# Patient Record
Sex: FEMALE | Race: WHITE | Employment: FULL TIME | URBAN - METROPOLITAN AREA
[De-identification: names, ages, dates, MRNs, and addresses within clinical notes are randomized per-mention and may not be internally consistent; named-entity substitution may affect disease eponyms.]

---

## 2017-02-19 ENCOUNTER — HOSPITAL ENCOUNTER (EMERGENCY)
Facility: HOSPITAL | Age: 52
Discharge: HOME | End: 2017-02-19
Attending: EMERGENCY MEDICINE
Payer: MEDICAID

## 2017-02-19 ENCOUNTER — APPOINTMENT (OUTPATIENT)
Dept: CT IMAGING | Facility: HOSPITAL | Age: 52
End: 2017-02-19
Attending: EMERGENCY MEDICINE
Payer: MEDICAID

## 2017-02-19 VITALS
TEMPERATURE: 99 F | WEIGHT: 200 LBS | SYSTOLIC BLOOD PRESSURE: 108 MMHG | BODY MASS INDEX: 34.15 KG/M2 | DIASTOLIC BLOOD PRESSURE: 64 MMHG | OXYGEN SATURATION: 98 % | RESPIRATION RATE: 16 BRPM | HEART RATE: 91 BPM | HEIGHT: 64 IN

## 2017-02-19 DIAGNOSIS — R10.9 ABDOMINAL PAIN OF UNKNOWN ETIOLOGY: ICD-10-CM

## 2017-02-19 DIAGNOSIS — J01.90 ACUTE SINUSITIS, RECURRENCE NOT SPECIFIED, UNSPECIFIED LOCATION: Primary | ICD-10-CM

## 2017-02-19 LAB
ALBUMIN SERPL BCP-MCNC: 3.8 G/DL (ref 3.5–4.8)
ALBUMIN/GLOB SERPL: 1.2 {RATIO} (ref 1–2)
ALP SERPL-CCNC: 78 U/L (ref 32–100)
ALT SERPL-CCNC: 22 U/L (ref 14–54)
ANION GAP SERPL CALC-SCNC: 9 MMOL/L (ref 0–18)
AST SERPL-CCNC: 25 U/L (ref 15–41)
BACTERIA UR QL AUTO: NEGATIVE /HPF
BASOPHILS # BLD: 0.1 K/UL (ref 0–0.2)
BASOPHILS NFR BLD: 1 %
BILIRUB SERPL-MCNC: 0.9 MG/DL (ref 0.3–1.2)
BILIRUB UR QL: NEGATIVE
BUN SERPL-MCNC: 11 MG/DL (ref 8–20)
BUN/CREAT SERPL: 13.8 (ref 10–20)
CALCIUM SERPL-MCNC: 9.2 MG/DL (ref 8.5–10.5)
CHLORIDE SERPL-SCNC: 102 MMOL/L (ref 95–110)
CLARITY UR: CLEAR
CO2 SERPL-SCNC: 25 MMOL/L (ref 22–32)
COLOR UR: YELLOW
CREAT SERPL-MCNC: 0.8 MG/DL (ref 0.5–1.5)
EOSINOPHIL # BLD: 0.1 K/UL (ref 0–0.7)
EOSINOPHIL NFR BLD: 1 %
ERYTHROCYTE [DISTWIDTH] IN BLOOD BY AUTOMATED COUNT: 13.9 % (ref 11–15)
GLOBULIN PLAS-MCNC: 3.1 G/DL (ref 2.5–3.7)
GLUCOSE SERPL-MCNC: 101 MG/DL (ref 70–99)
GLUCOSE UR-MCNC: NEGATIVE MG/DL
HCT VFR BLD AUTO: 40 % (ref 35–48)
HGB BLD-MCNC: 13.4 G/DL (ref 12–16)
KETONES UR-MCNC: NEGATIVE MG/DL
LEUKOCYTE ESTERASE UR QL STRIP.AUTO: NEGATIVE
LYMPHOCYTES # BLD: 1 K/UL (ref 1–4)
LYMPHOCYTES NFR BLD: 9 %
MCH RBC QN AUTO: 29.2 PG (ref 27–32)
MCHC RBC AUTO-ENTMCNC: 33.4 G/DL (ref 32–37)
MCV RBC AUTO: 87.4 FL (ref 80–100)
MONOCYTES # BLD: 0.7 K/UL (ref 0–1)
MONOCYTES NFR BLD: 6 %
NEUTROPHILS # BLD AUTO: 9.2 K/UL (ref 1.8–7.7)
NEUTROPHILS NFR BLD: 83 %
NITRITE UR QL STRIP.AUTO: NEGATIVE
OSMOLALITY UR CALC.SUM OF ELEC: 282 MOSM/KG (ref 275–295)
PH UR: 6 [PH] (ref 5–8)
PLATELET # BLD AUTO: 270 K/UL (ref 140–400)
PMV BLD AUTO: 7.2 FL (ref 7.4–10.3)
POTASSIUM SERPL-SCNC: 4.4 MMOL/L (ref 3.3–5.1)
PROT SERPL-MCNC: 6.9 G/DL (ref 5.9–8.4)
PROT UR-MCNC: NEGATIVE MG/DL
RBC # BLD AUTO: 4.58 M/UL (ref 3.7–5.4)
RBC #/AREA URNS AUTO: 17 /HPF
SODIUM SERPL-SCNC: 136 MMOL/L (ref 136–144)
SP GR UR STRIP: 1.02 (ref 1–1.03)
UROBILINOGEN UR STRIP-ACNC: <2
VIT C UR-MCNC: NEGATIVE MG/DL
WBC # BLD AUTO: 11 K/UL (ref 4–11)
WBC #/AREA URNS AUTO: <1 /HPF

## 2017-02-19 PROCEDURE — 96361 HYDRATE IV INFUSION ADD-ON: CPT

## 2017-02-19 PROCEDURE — 85025 COMPLETE CBC W/AUTO DIFF WBC: CPT | Performed by: EMERGENCY MEDICINE

## 2017-02-19 PROCEDURE — 81001 URINALYSIS AUTO W/SCOPE: CPT | Performed by: EMERGENCY MEDICINE

## 2017-02-19 PROCEDURE — 96374 THER/PROPH/DIAG INJ IV PUSH: CPT

## 2017-02-19 PROCEDURE — 74176 CT ABD & PELVIS W/O CONTRAST: CPT

## 2017-02-19 PROCEDURE — 80053 COMPREHEN METABOLIC PANEL: CPT | Performed by: EMERGENCY MEDICINE

## 2017-02-19 PROCEDURE — 99284 EMERGENCY DEPT VISIT MOD MDM: CPT

## 2017-02-19 RX ORDER — ONDANSETRON 2 MG/ML
4 INJECTION INTRAMUSCULAR; INTRAVENOUS ONCE
Status: COMPLETED | OUTPATIENT
Start: 2017-02-19 | End: 2017-02-19

## 2017-02-19 RX ORDER — PREDNISONE 20 MG/1
40 TABLET ORAL DAILY
Qty: 6 TABLET | Refills: 0 | Status: SHIPPED | OUTPATIENT
Start: 2017-02-19 | End: 2017-02-22

## 2017-02-19 RX ORDER — AZITHROMYCIN 250 MG/1
TABLET, FILM COATED ORAL
Qty: 1 PACKAGE | Refills: 0 | Status: SHIPPED | OUTPATIENT
Start: 2017-02-19 | End: 2017-02-24

## 2017-02-19 RX ORDER — SACCHAROMYCES BOULARDII 250 MG
250 CAPSULE ORAL 2 TIMES DAILY
Qty: 20 CAPSULE | Refills: 0 | Status: SHIPPED | OUTPATIENT
Start: 2017-02-19 | End: 2017-03-01

## 2017-02-19 NOTE — ED INITIAL ASSESSMENT (HPI)
Pt returned from vacation Thursday and c/o chills, sinus infection. Today with nausea, diarrhea and abd pain. No dysuria. Taking sudafed for sinus problems.

## 2017-02-20 NOTE — ED PROVIDER NOTES
Patient Seen in: [facility] AND [facility]    History   Patient presents with: Body ache and/or chills    Stated Complaint: Abdominal Pain; Diarrhea;     HPI    Patient complains of uri symptoms with sinus congestion for few days.   Sinus co 1046 Oral   SpO2 02/19/17 1046 100 %   O2 Device 02/19/17 1046 None (Room air)       Current:/64 mmHg  Pulse 91  Temp(Src) 98.9 °F (37.2 °C) (Oral)  Resp 16  Ht 162.6 cm (5' 4\")  Wt 90.719 kg  BMI 34.31 kg/m2  SpO2 98%  LMP 04/29/2005   PULSE OX Nl Abdomen+pelvis(cpt=74176)    2/19/2017  CONCLUSION:  1. No significant small or large bowel dilatation. No significant diverticular disease. 2. Hysterectomy. Bilobed left adnexal cyst measures 3.6 x 3.2 cm.  Similar finding present in retrospect on prior ex

## 2017-04-13 ENCOUNTER — HOSPITAL ENCOUNTER (OUTPATIENT)
Facility: HOSPITAL | Age: 52
Setting detail: OBSERVATION
Discharge: HOME | End: 2017-04-15
Attending: EMERGENCY MEDICINE | Admitting: HOSPITALIST
Payer: MEDICAID

## 2017-04-13 DIAGNOSIS — K62.5 RECTAL BLEEDING: Primary | ICD-10-CM

## 2017-04-13 DIAGNOSIS — R19.7 DIARRHEA, UNSPECIFIED TYPE: ICD-10-CM

## 2017-04-13 PROCEDURE — 80048 BASIC METABOLIC PNL TOTAL CA: CPT | Performed by: EMERGENCY MEDICINE

## 2017-04-13 PROCEDURE — 96360 HYDRATION IV INFUSION INIT: CPT

## 2017-04-13 PROCEDURE — 99285 EMERGENCY DEPT VISIT HI MDM: CPT

## 2017-04-13 PROCEDURE — 86901 BLOOD TYPING SEROLOGIC RH(D): CPT | Performed by: EMERGENCY MEDICINE

## 2017-04-13 PROCEDURE — 86900 BLOOD TYPING SEROLOGIC ABO: CPT | Performed by: EMERGENCY MEDICINE

## 2017-04-13 PROCEDURE — 87507 IADNA-DNA/RNA PROBE TQ 12-25: CPT | Performed by: HOSPITALIST

## 2017-04-13 PROCEDURE — 86850 RBC ANTIBODY SCREEN: CPT | Performed by: EMERGENCY MEDICINE

## 2017-04-13 PROCEDURE — 85014 HEMATOCRIT: CPT | Performed by: HOSPITALIST

## 2017-04-13 PROCEDURE — 96361 HYDRATE IV INFUSION ADD-ON: CPT

## 2017-04-13 PROCEDURE — 81001 URINALYSIS AUTO W/SCOPE: CPT | Performed by: EMERGENCY MEDICINE

## 2017-04-13 PROCEDURE — 85018 HEMOGLOBIN: CPT | Performed by: HOSPITALIST

## 2017-04-13 PROCEDURE — 85025 COMPLETE CBC W/AUTO DIFF WBC: CPT | Performed by: EMERGENCY MEDICINE

## 2017-04-13 RX ORDER — MORPHINE SULFATE 4 MG/ML
4 INJECTION, SOLUTION INTRAMUSCULAR; INTRAVENOUS EVERY 2 HOUR PRN
Status: DISCONTINUED | OUTPATIENT
Start: 2017-04-13 | End: 2017-04-15

## 2017-04-13 RX ORDER — CARVEDILOL 25 MG/1
25 TABLET ORAL 2 TIMES DAILY WITH MEALS
Status: DISCONTINUED | OUTPATIENT
Start: 2017-04-13 | End: 2017-04-15

## 2017-04-13 RX ORDER — ATORVASTATIN CALCIUM 10 MG/1
10 TABLET, FILM COATED ORAL NIGHTLY
Status: DISCONTINUED | OUTPATIENT
Start: 2017-04-13 | End: 2017-04-15

## 2017-04-13 RX ORDER — ONDANSETRON 2 MG/ML
4 INJECTION INTRAMUSCULAR; INTRAVENOUS EVERY 6 HOURS PRN
Status: DISCONTINUED | OUTPATIENT
Start: 2017-04-13 | End: 2017-04-15

## 2017-04-13 RX ORDER — DEXTROSE AND SODIUM CHLORIDE 5; .45 G/100ML; G/100ML
INJECTION, SOLUTION INTRAVENOUS CONTINUOUS
Status: DISCONTINUED | OUTPATIENT
Start: 2017-04-13 | End: 2017-04-14

## 2017-04-13 RX ORDER — MORPHINE SULFATE 2 MG/ML
1 INJECTION, SOLUTION INTRAMUSCULAR; INTRAVENOUS EVERY 2 HOUR PRN
Status: DISCONTINUED | OUTPATIENT
Start: 2017-04-13 | End: 2017-04-15

## 2017-04-13 RX ORDER — MORPHINE SULFATE 2 MG/ML
2 INJECTION, SOLUTION INTRAMUSCULAR; INTRAVENOUS EVERY 2 HOUR PRN
Status: DISCONTINUED | OUTPATIENT
Start: 2017-04-13 | End: 2017-04-15

## 2017-04-13 RX ORDER — ACETAMINOPHEN 325 MG/1
650 TABLET ORAL EVERY 6 HOURS PRN
Status: DISCONTINUED | OUTPATIENT
Start: 2017-04-13 | End: 2017-04-15

## 2017-04-13 NOTE — ED PROVIDER NOTES
Patient Seen in: [facility] Emergency Department    History   Patient presents with:  GI Bleeding (gastrointestinal)    Stated Complaint: diarrhea    HPI    Patient is a 48-year-old female with a past history of cardiomyopathy, arrhythmias in the Current:/70 mmHg  Pulse 72  Temp(Src) 98.4 °F (36.9 °C) (Oral)  Resp 19  Ht 162.6 cm (5' 4\")  Wt 86.183 kg  BMI 32.60 kg/m2  SpO2 100%  LMP 04/29/2005        Physical Exam    Constitutional: Well-developed well-nourished in no acute distress following orders were created for panel order TYPE AND SCREEN.   Procedure                               Abnormality         Status                     ---------                               -----------         ------                     BLOOD TYPE, ABO AN

## 2017-04-13 NOTE — H&P
[facility] Hospitalist H&P       CC: Patient presents with:  GI Bleeding (gastrointestinal)       PCP: Idania Barth MD    ASSESSMENT / PLAN:   Patient is a 45year old female with PMH sig for cardiomyopathy EF 40%, pvcs, and HLD who presents with a c/co o contacts raw/undercooked food or recent travel. No recent antibiotics. No chest pain/sob. No fever/chills. Never had CHF with her cardiomyopathy.       [facility]  Past Medical History   Diagnosis Date   • Cardiomyopathy ([facility] [address]    • Cardiomyopathy ([facility] [address]    • Visual appreciated   Abdomen:   Soft, mild tenderness lower abdominal quadrants. No masses,  No organomegaly. Non distended. +bs heard   Extremities: Extremities normal, atraumatic, no cyanosis or edema. Skin: Skin color, texture. No rashes or lesions.     Rea Calderon

## 2017-04-13 NOTE — CONSULTS
REFERRING PHYSICIAN: Dr. Ochoa ref. provider found    HPI:         Thank you very much for requesting me to see the patient.     As you know, Bia Rodriguez is a 45year old female who presented to ER today with c/o severe abd cramps starting about 9:30 am y lesions  HEENT: anicteric; no JVD. NECK: supple, no adenopathy, no bruits  LUNGS: clear to auscultation  CARDIO: RRR, nl s1 and s2. No murmers appreciated. GI: Soft, NT, ND, normal BS. NO HSM, masses, hernias or bruits.   EXTREMITIES: no cyanosis, clubbin

## 2017-04-14 PROCEDURE — 80048 BASIC METABOLIC PNL TOTAL CA: CPT | Performed by: HOSPITALIST

## 2017-04-14 PROCEDURE — 96361 HYDRATE IV INFUSION ADD-ON: CPT

## 2017-04-14 PROCEDURE — 85014 HEMATOCRIT: CPT | Performed by: HOSPITALIST

## 2017-04-14 PROCEDURE — 85018 HEMOGLOBIN: CPT | Performed by: HOSPITALIST

## 2017-04-14 PROCEDURE — 85025 COMPLETE CBC W/AUTO DIFF WBC: CPT | Performed by: HOSPITALIST

## 2017-04-14 RX ORDER — LISINOPRIL 5 MG/1
5 TABLET ORAL DAILY
Status: DISCONTINUED | OUTPATIENT
Start: 2017-04-14 | End: 2017-04-15

## 2017-04-14 NOTE — PROGRESS NOTES
GI  PROGRESS NOTE    SUBJECTIVE: relates diarrhea persists, 3 BM's overnight. Watery diarrhea without brb. Abdominal cramps prior to BM's. Tolerating diet.        OBJECTIVE:  Temp:  [98 °F (36.7 °C)-98.9 °F (37.2 °C)] 98.4 °F (36.9 °C)  Pulse:  [61-81] 61

## 2017-04-14 NOTE — PAYOR COMM NOTE
Alicia Gun #[id number]  (47 year old F)       [facility] [id number]         Santos Hall DO Physician Signed  H&P 4/13/2017  4:47 PM      Expand All Collapse All    [facility] Hospitalist H&P        CC: Patient presents with:  GI Bleeding (gastrointestinal) had a bowel movement. Bowel movement was watery and initially brown stool which progressed to contain bright red blood later in the day. No nausea or vomiting. No sick contacts raw/undercooked food or recent travel. No recent antibiotics.  No chest pain/sob or supraclavicular lymph adenopathy, thyroid: no enlargment/tenderness/nodules appreciated    Lungs:    Clear to auscultation bilaterally.  Normal effort    Chest wall:   No tenderness or deformity.    Heart:   Regular rate and rhythm, S1, S2 normal, no mur

## 2017-04-14 NOTE — PROGRESS NOTES
[facility] Hospitalist Progress Note     CC: Hospital Follow up    PCP: Tabby Stockton MD       Assessment/Plan:   Patient is a 45year old female with PMH sig for cardiomyopathy EF 40%, pvcs, and HLD who presents with a c/co of diarrhea, abdominal cramping (36.7 °C)-98.9 °F (37.2 °C)] 98.4 °F (36.9 °C)  Pulse:  [61-81] 61  Resp:  [18-19] 18  BP: (105-126)/(55-77) 123/73 mmHg      Intake/Output:    Intake/Output Summary (Last 24 hours) at 04/14/17 1403  Last data filed at 04/14/17 0900   Gross per 24 hour   I HCl

## 2017-04-14 NOTE — PLAN OF CARE
Problem: Patient/Family Goals  Goal: Patient/Family Long Term Goal  Patient’s Long Term Goal: Patient will return home.   Interventions:  Clear liquid diet  Monitor I & O  Advance diet as tolerated  Monitor electrolytes and lab resuls  - See additional Care

## 2017-04-15 VITALS
WEIGHT: 207.31 LBS | TEMPERATURE: 98 F | OXYGEN SATURATION: 96 % | BODY MASS INDEX: 35.39 KG/M2 | HEART RATE: 62 BPM | HEIGHT: 64 IN | DIASTOLIC BLOOD PRESSURE: 76 MMHG | SYSTOLIC BLOOD PRESSURE: 121 MMHG | RESPIRATION RATE: 18 BRPM

## 2017-04-15 PROCEDURE — 80048 BASIC METABOLIC PNL TOTAL CA: CPT | Performed by: HOSPITALIST

## 2017-04-15 PROCEDURE — 85025 COMPLETE CBC W/AUTO DIFF WBC: CPT | Performed by: HOSPITALIST

## 2017-04-15 NOTE — PROGRESS NOTES
NURSING DISCHARGE NOTE    Discharged Home via Ambulatory. Accompanied by Support staff  Belongings Taken by patient/family.

## 2017-04-15 NOTE — DISCHARGE SUMMARY
[facility] Hospitalist Discharge Summary   Patient ID:  Ryann Wheeler  [id number]  59 year old  6/11/1965    Admit date: 4/13/2017  Discharge date: 4/15/2017  Primary Care Physician: Keon Hurtado MD   Attending Physician: No att. providers found   Cons mcguire  EXTREMITIES: no edema, no calf tenderness    Operative Procedures:    Radiology:         Discharge Instructions     Medication List      CONTINUE taking these medications          Atorvastatin Calcium 10 MG Tabs   Commonly known as:  LIPITOR       C

## 2017-04-15 NOTE — PROGRESS NOTES
[facility]  Gastroenterology Progress Note    Damon Solares Patient Status:  Observation    1965 MRN [id number]   Location [address] [facility] Attending Santos Hall, [address] [facility] Day # 2 PCP Dimas Molina MD     Subjective:   Danni Armendariz

## 2017-04-15 NOTE — PLAN OF CARE
Problem: Patient/Family Goals  Goal: Patient/Family Long Term Goal  Patient’s Long Term Goal: Patient will return home.   Interventions:  Monitor I & O  Advance diet as tolerated  Monitor electrolytes and lab resuls  - See additional Care Plan goals for spe

## 2017-08-25 PROBLEM — D17.9 MULTIPLE LIPOMAS: Status: ACTIVE | Noted: 2017-08-25

## 2017-11-29 ENCOUNTER — LAB ENCOUNTER (OUTPATIENT)
Dept: LAB | Age: 52
End: 2017-11-29
Attending: SPECIALIST
Payer: MEDICAID

## 2017-11-29 DIAGNOSIS — I10 HTN (HYPERTENSION): ICD-10-CM

## 2017-11-29 DIAGNOSIS — E78.49 FAMILIAL HYPERLIPIDEMIA: ICD-10-CM

## 2017-11-29 DIAGNOSIS — Z79.899 HIGH RISK MEDICATIONS (NOT ANTICOAGULANTS) LONG-TERM USE: Primary | ICD-10-CM

## 2017-11-29 PROCEDURE — 84443 ASSAY THYROID STIM HORMONE: CPT

## 2017-11-29 PROCEDURE — 80061 LIPID PANEL: CPT

## 2017-11-29 PROCEDURE — 36415 COLL VENOUS BLD VENIPUNCTURE: CPT

## 2017-11-29 PROCEDURE — 80053 COMPREHEN METABOLIC PANEL: CPT

## 2017-11-29 PROCEDURE — 83735 ASSAY OF MAGNESIUM: CPT

## 2018-01-28 ENCOUNTER — HOSPITAL ENCOUNTER (EMERGENCY)
Facility: HOSPITAL | Age: 53
Discharge: HOME | End: 2018-01-29
Attending: EMERGENCY MEDICINE
Payer: MEDICAID

## 2018-01-28 DIAGNOSIS — R11.2 NAUSEA VOMITING AND DIARRHEA: Primary | ICD-10-CM

## 2018-01-28 DIAGNOSIS — R19.7 NAUSEA VOMITING AND DIARRHEA: Primary | ICD-10-CM

## 2018-01-28 DIAGNOSIS — R10.84 ABDOMINAL PAIN, GENERALIZED: ICD-10-CM

## 2018-01-28 LAB
BASOPHILS # BLD: 0 K/UL (ref 0–0.2)
BASOPHILS NFR BLD: 0 %
EOSINOPHIL # BLD: 0 K/UL (ref 0–0.7)
EOSINOPHIL NFR BLD: 0 %
ERYTHROCYTE [DISTWIDTH] IN BLOOD BY AUTOMATED COUNT: 14 % (ref 11–15)
HCT VFR BLD AUTO: 43.7 % (ref 35–48)
HGB BLD-MCNC: 14.5 G/DL (ref 12–16)
LYMPHOCYTES # BLD: 0.2 K/UL (ref 1–4)
LYMPHOCYTES NFR BLD: 2 %
MCH RBC QN AUTO: 29.1 PG (ref 27–32)
MCHC RBC AUTO-ENTMCNC: 33.1 G/DL (ref 32–37)
MCV RBC AUTO: 88 FL (ref 80–100)
MONOCYTES # BLD: 0.3 K/UL (ref 0–1)
MONOCYTES NFR BLD: 3 %
NEUTROPHILS # BLD AUTO: 8.6 K/UL (ref 1.8–7.7)
NEUTROPHILS NFR BLD: 94 %
PLATELET # BLD AUTO: 276 K/UL (ref 140–400)
PMV BLD AUTO: 6.8 FL (ref 7.4–10.3)
RBC # BLD AUTO: 4.97 M/UL (ref 3.7–5.4)
WBC # BLD AUTO: 9.2 K/UL (ref 4–11)

## 2018-01-28 PROCEDURE — 80048 BASIC METABOLIC PNL TOTAL CA: CPT | Performed by: EMERGENCY MEDICINE

## 2018-01-28 PROCEDURE — 99284 EMERGENCY DEPT VISIT MOD MDM: CPT

## 2018-01-28 PROCEDURE — 96375 TX/PRO/DX INJ NEW DRUG ADDON: CPT

## 2018-01-28 PROCEDURE — 85025 COMPLETE CBC W/AUTO DIFF WBC: CPT | Performed by: EMERGENCY MEDICINE

## 2018-01-28 PROCEDURE — 96361 HYDRATE IV INFUSION ADD-ON: CPT

## 2018-01-28 PROCEDURE — 96374 THER/PROPH/DIAG INJ IV PUSH: CPT

## 2018-01-28 RX ORDER — ONDANSETRON 2 MG/ML
4 INJECTION INTRAMUSCULAR; INTRAVENOUS ONCE
Status: COMPLETED | OUTPATIENT
Start: 2018-01-28 | End: 2018-01-28

## 2018-01-28 RX ORDER — ONDANSETRON 4 MG/1
4 TABLET, ORALLY DISINTEGRATING ORAL ONCE
Status: COMPLETED | OUTPATIENT
Start: 2018-01-28 | End: 2018-01-28

## 2018-01-28 RX ORDER — DICYCLOMINE HCL 20 MG
20 TABLET ORAL ONCE
Status: COMPLETED | OUTPATIENT
Start: 2018-01-28 | End: 2018-01-28

## 2018-01-28 RX ORDER — KETOROLAC TROMETHAMINE 30 MG/ML
30 INJECTION, SOLUTION INTRAMUSCULAR; INTRAVENOUS ONCE
Status: COMPLETED | OUTPATIENT
Start: 2018-01-28 | End: 2018-01-28

## 2018-01-29 VITALS
OXYGEN SATURATION: 99 % | SYSTOLIC BLOOD PRESSURE: 131 MMHG | WEIGHT: 200 LBS | HEART RATE: 94 BPM | DIASTOLIC BLOOD PRESSURE: 80 MMHG | RESPIRATION RATE: 18 BRPM | TEMPERATURE: 99 F | BODY MASS INDEX: 35 KG/M2

## 2018-01-29 LAB
ANION GAP SERPL CALC-SCNC: 10 MMOL/L (ref 0–18)
BUN SERPL-MCNC: 18 MG/DL (ref 8–20)
BUN/CREAT SERPL: 18.9 (ref 10–20)
CALCIUM SERPL-MCNC: 9 MG/DL (ref 8.5–10.5)
CHLORIDE SERPL-SCNC: 104 MMOL/L (ref 95–110)
CO2 SERPL-SCNC: 25 MMOL/L (ref 22–32)
CREAT SERPL-MCNC: 0.95 MG/DL (ref 0.5–1.5)
GLUCOSE SERPL-MCNC: 135 MG/DL (ref 70–99)
OSMOLALITY UR CALC.SUM OF ELEC: 292 MOSM/KG (ref 275–295)
POTASSIUM SERPL-SCNC: 3.7 MMOL/L (ref 3.3–5.1)
SODIUM SERPL-SCNC: 139 MMOL/L (ref 136–144)

## 2018-01-29 RX ORDER — MAGNESIUM HYDROXIDE/ALUMINUM HYDROXICE/SIMETHICONE 120; 1200; 1200 MG/30ML; MG/30ML; MG/30ML
30 SUSPENSION ORAL ONCE
Status: COMPLETED | OUTPATIENT
Start: 2018-01-29 | End: 2018-01-29

## 2018-01-29 RX ORDER — DICYCLOMINE HCL 20 MG
20 TABLET ORAL 4 TIMES DAILY PRN
Qty: 30 TABLET | Refills: 0 | Status: SHIPPED | OUTPATIENT
Start: 2018-01-29 | End: 2018-08-27

## 2018-01-29 RX ORDER — LOPERAMIDE HYDROCHLORIDE 2 MG/1
2 TABLET ORAL AS NEEDED
Qty: 20 TABLET | Refills: 0 | Status: SHIPPED | OUTPATIENT
Start: 2018-01-29 | End: 2018-02-28

## 2018-01-29 RX ORDER — ONDANSETRON 4 MG/1
4 TABLET, ORALLY DISINTEGRATING ORAL EVERY 4 HOURS PRN
Qty: 15 TABLET | Refills: 0 | Status: SHIPPED | OUTPATIENT
Start: 2018-01-29 | End: 2018-02-02 | Stop reason: CLARIF

## 2018-01-29 NOTE — ED PROVIDER NOTES
Patient Seen in: [facility] Emergency Department    History   Patient presents with:  Nausea/Vomiting/Diarrhea (gastrointestinal)      HPI    Patient presents to the ED complaining of intermittent crampy abdominal pain, nausea, vomiting, diarrhea Constitutional: She appears well-developed and well-nourished. No distress. HENT:   Head: Normocephalic and atraumatic. Eyes: Conjunctivae are normal. Right eye exhibits no discharge. Left eye exhibits no discharge.    Neck: No tracheal deviation pres ketorolac tromethamine (TORADOL) 30 MG/ML injection 30 mg (30 mg Intravenous Given 1/28/18 2333)   Dicyclomine HCl (BENTYL) tab 20 mg (20 mg Oral Given 1/28/18 2333)   Alum & Mag Hydroxide-Simeth (MAALOX) oral suspension 30 mL (30 mL Oral Given 1/29/18 0 generalized    Disposition:  Discharge    Follow-up:  Domonique Jeffries [address] [phone]    Schedule an appointment as soon as possible for a visit in 3 days        Medications Prescribed:  Discharge

## 2018-03-21 ENCOUNTER — APPOINTMENT (OUTPATIENT)
Dept: CT IMAGING | Facility: HOSPITAL | Age: 53
End: 2018-03-21
Attending: EMERGENCY MEDICINE
Payer: MEDICAID

## 2018-03-21 ENCOUNTER — HOSPITAL ENCOUNTER (EMERGENCY)
Facility: HOSPITAL | Age: 53
Discharge: HOME | End: 2018-03-21
Attending: EMERGENCY MEDICINE
Payer: MEDICAID

## 2018-03-21 ENCOUNTER — APPOINTMENT (OUTPATIENT)
Dept: GENERAL RADIOLOGY | Facility: HOSPITAL | Age: 53
End: 2018-03-21
Attending: EMERGENCY MEDICINE
Payer: MEDICAID

## 2018-03-21 VITALS
RESPIRATION RATE: 15 BRPM | WEIGHT: 205 LBS | TEMPERATURE: 98 F | DIASTOLIC BLOOD PRESSURE: 75 MMHG | BODY MASS INDEX: 36 KG/M2 | HEART RATE: 72 BPM | OXYGEN SATURATION: 99 % | SYSTOLIC BLOOD PRESSURE: 118 MMHG

## 2018-03-21 DIAGNOSIS — R07.9 CHEST PAIN OF UNCERTAIN ETIOLOGY: Primary | ICD-10-CM

## 2018-03-21 LAB
ANION GAP SERPL CALC-SCNC: 10 MMOL/L (ref 0–18)
BASOPHILS # BLD: 0 K/UL (ref 0–0.2)
BASOPHILS NFR BLD: 0 %
BNP SERPL-MCNC: 14 PG/ML (ref 0–100)
BUN SERPL-MCNC: 16 MG/DL (ref 8–20)
BUN/CREAT SERPL: 21.3 (ref 10–20)
CALCIUM SERPL-MCNC: 9.8 MG/DL (ref 8.5–10.5)
CHLORIDE SERPL-SCNC: 103 MMOL/L (ref 95–110)
CO2 SERPL-SCNC: 27 MMOL/L (ref 22–32)
CREAT SERPL-MCNC: 0.75 MG/DL (ref 0.5–1.5)
D DIMER PPP FEU-MCNC: 0.8 MCG/ML (ref ?–0.53)
EOSINOPHIL # BLD: 0.1 K/UL (ref 0–0.7)
EOSINOPHIL NFR BLD: 1 %
ERYTHROCYTE [DISTWIDTH] IN BLOOD BY AUTOMATED COUNT: 13.7 % (ref 11–15)
GLUCOSE SERPL-MCNC: 77 MG/DL (ref 70–99)
HCT VFR BLD AUTO: 38.9 % (ref 35–48)
HGB BLD-MCNC: 13.4 G/DL (ref 12–16)
LYMPHOCYTES # BLD: 2 K/UL (ref 1–4)
LYMPHOCYTES NFR BLD: 38 %
MCH RBC QN AUTO: 30.2 PG (ref 27–32)
MCHC RBC AUTO-ENTMCNC: 34.4 G/DL (ref 32–37)
MCV RBC AUTO: 87.8 FL (ref 80–100)
MONOCYTES # BLD: 0.4 K/UL (ref 0–1)
MONOCYTES NFR BLD: 8 %
NEUTROPHILS # BLD AUTO: 2.9 K/UL (ref 1.8–7.7)
NEUTROPHILS NFR BLD: 53 %
OSMOLALITY UR CALC.SUM OF ELEC: 290 MOSM/KG (ref 275–295)
PLATELET # BLD AUTO: 289 K/UL (ref 140–400)
PMV BLD AUTO: 7.2 FL (ref 7.4–10.3)
POTASSIUM SERPL-SCNC: 4.1 MMOL/L (ref 3.3–5.1)
RBC # BLD AUTO: 4.43 M/UL (ref 3.7–5.4)
SODIUM SERPL-SCNC: 140 MMOL/L (ref 136–144)
TROPONIN I SERPL-MCNC: 0 NG/ML (ref ?–0.03)
WBC # BLD AUTO: 5.4 K/UL (ref 4–11)

## 2018-03-21 PROCEDURE — 84484 ASSAY OF TROPONIN QUANT: CPT | Performed by: EMERGENCY MEDICINE

## 2018-03-21 PROCEDURE — 85025 COMPLETE CBC W/AUTO DIFF WBC: CPT | Performed by: EMERGENCY MEDICINE

## 2018-03-21 PROCEDURE — 36415 COLL VENOUS BLD VENIPUNCTURE: CPT

## 2018-03-21 PROCEDURE — 93010 ELECTROCARDIOGRAM REPORT: CPT | Performed by: EMERGENCY MEDICINE

## 2018-03-21 PROCEDURE — 80048 BASIC METABOLIC PNL TOTAL CA: CPT | Performed by: EMERGENCY MEDICINE

## 2018-03-21 PROCEDURE — 71260 CT THORAX DX C+: CPT | Performed by: EMERGENCY MEDICINE

## 2018-03-21 PROCEDURE — 71045 X-RAY EXAM CHEST 1 VIEW: CPT | Performed by: EMERGENCY MEDICINE

## 2018-03-21 PROCEDURE — 99285 EMERGENCY DEPT VISIT HI MDM: CPT

## 2018-03-21 PROCEDURE — 83880 ASSAY OF NATRIURETIC PEPTIDE: CPT | Performed by: EMERGENCY MEDICINE

## 2018-03-21 PROCEDURE — 93005 ELECTROCARDIOGRAM TRACING: CPT

## 2018-03-21 PROCEDURE — 85379 FIBRIN DEGRADATION QUANT: CPT | Performed by: EMERGENCY MEDICINE

## 2018-03-21 NOTE — ED INITIAL ASSESSMENT (HPI)
Patient here with c/o chest pain, worse over the past week intermittent. Patient states it is worse at night. Hx cardiomyopathy. Also sob with it.

## 2018-03-21 NOTE — ED NOTES
Patient states she has had chest pains over the last week. States she \"does not feel like herself. \" States pain lasts for a minute or so and nothing makes it worse or better.

## 2018-03-21 NOTE — ED PROVIDER NOTES
Patient Seen in: [facility] AND [facility] Emergency Department    History   Patient presents with:  Chest Pain Angina (cardiovascular)    Stated Complaint: chest pain, cardiomyopathy    HPI    47year old female presenting with chest pain.  Pain began this past from today and agreed except as otherwise stated in HPI.     Physical Exam   ED Triage Vitals [03/21/18 1805]  BP: 148/85  Pulse: 77  Resp: 16  Temp: 98 °F (36.7 °C)  Temp src: n/a  SpO2: 100 %  O2 Device: None (Room air)    Current:/75   Pulse 72   T Normal   CBC WITH DIFFERENTIAL WITH PLATELET    Narrative: The following orders were created for panel order CBC WITH DIFFERENTIAL WITH PLATELET.   Procedure                               Abnormality         Status                     --------- (>65 2pt, 45-64 1 pt, Under 45 0 pt)    1  Risk Factors- ( DM,HTN,Chol, fhx CAD, BMI>30, hx CAD)  ( >3 or hx CAD 2pt, 1-2 risks 1pt, None 0pt)  1  Troponin- ( 3 times nl 2pt, 2 times nl 1pt, nl 0pt   0         TOTAL  3    SCORE 0-3: 2.5% MACE over next 6 w

## 2018-03-30 ENCOUNTER — HOSPITAL (OUTPATIENT)
Dept: OTHER | Age: 53
End: 2018-03-30
Attending: SPECIALIST

## 2018-04-02 ENCOUNTER — HOSPITAL (OUTPATIENT)
Dept: OTHER | Age: 53
End: 2018-04-02
Attending: SPECIALIST

## 2018-04-05 ENCOUNTER — CHARTING TRANS (OUTPATIENT)
Dept: OTHER | Age: 53
End: 2018-04-05

## 2018-05-09 ENCOUNTER — NURSE ONLY (OUTPATIENT)
Dept: INTEGRATIVE MEDICINE | Facility: HOSPITAL | Age: 53
End: 2018-05-09
Attending: GENERAL ACUTE CARE HOSPITAL

## 2018-05-09 NOTE — PROGRESS NOTES
Reiki Follow Up Note    Patient Name: Annette Ramos   YOB: 1965   Medical Record Number: [id number]   CSN: [id number]     Date of Visit: 5/9/2018    Reason for scheduling reiki session: No chief complaint on file. Relaxation.   Desire to e Oral Tab Take 5 mg by mouth daily. Disp:  Rfl: 2   Dicyclomine HCl 20 MG Oral Tab Take 1 tablet (20 mg total) by mouth 4 (four) times daily as needed (Abdominal pain).  Disp: 30 tablet Rfl: 0   Atorvastatin Calcium 10 MG Oral Tab Take 10 mg by mouth night

## 2018-06-13 ENCOUNTER — PRIOR ORIGINAL RECORDS (OUTPATIENT)
Dept: OTHER | Age: 53
End: 2018-06-13

## 2018-06-13 ENCOUNTER — MYAURORA ACCOUNT LINK (OUTPATIENT)
Dept: OTHER | Age: 53
End: 2018-06-13

## 2018-06-21 ENCOUNTER — PRIOR ORIGINAL RECORDS (OUTPATIENT)
Dept: OTHER | Age: 53
End: 2018-06-21

## 2018-06-21 ENCOUNTER — HOSPITAL ENCOUNTER (OUTPATIENT)
Dept: MRI IMAGING | Facility: HOSPITAL | Age: 53
Discharge: HOME | End: 2018-06-21
Attending: INTERNAL MEDICINE
Payer: COMMERCIAL

## 2018-06-21 DIAGNOSIS — I25.5 CARDIOMYOPATHY, ISCHEMIC: ICD-10-CM

## 2018-06-21 DIAGNOSIS — R07.9 CHEST PAIN, UNSPECIFIED TYPE: ICD-10-CM

## 2018-06-21 DIAGNOSIS — I50.20 SYSTOLIC HEART FAILURE, UNSPECIFIED HF CHRONICITY (HCC): ICD-10-CM

## 2018-06-21 PROCEDURE — 75557 CARDIAC MRI FOR MORPH: CPT | Performed by: INTERNAL MEDICINE

## 2018-06-25 ENCOUNTER — PRIOR ORIGINAL RECORDS (OUTPATIENT)
Dept: OTHER | Age: 53
End: 2018-06-25

## 2018-06-28 ENCOUNTER — PRIOR ORIGINAL RECORDS (OUTPATIENT)
Dept: OTHER | Age: 53
End: 2018-06-28

## 2018-08-11 ENCOUNTER — HOSPITAL ENCOUNTER (EMERGENCY)
Facility: HOSPITAL | Age: 53
Discharge: LEFT BEFORE DISCHARGE | End: 2018-08-11
Payer: COMMERCIAL

## 2018-08-11 VITALS
SYSTOLIC BLOOD PRESSURE: 143 MMHG | RESPIRATION RATE: 18 BRPM | WEIGHT: 190 LBS | DIASTOLIC BLOOD PRESSURE: 90 MMHG | HEART RATE: 61 BPM | BODY MASS INDEX: 32.44 KG/M2 | TEMPERATURE: 98 F | OXYGEN SATURATION: 99 % | HEIGHT: 64 IN

## 2018-08-11 PROCEDURE — 93010 ELECTROCARDIOGRAM REPORT: CPT | Performed by: INTERNAL MEDICINE

## 2018-08-11 PROCEDURE — 93005 ELECTROCARDIOGRAM TRACING: CPT

## 2018-08-11 NOTE — ED NOTES
Pt states she does not want to stay. RN spoke with pt, she was notified that a room is available at this time and we can take her to see the MD. Pt does not want to stay and get bld work done. She states she she will return for new/worse sx. LWBS.

## 2018-08-24 PROCEDURE — 81001 URINALYSIS AUTO W/SCOPE: CPT | Performed by: INTERNAL MEDICINE

## 2018-08-24 PROCEDURE — 87086 URINE CULTURE/COLONY COUNT: CPT | Performed by: INTERNAL MEDICINE

## 2018-08-27 ENCOUNTER — OFFICE VISIT (OUTPATIENT)
Dept: PODIATRY CLINIC | Facility: CLINIC | Age: 53
End: 2018-08-27
Payer: COMMERCIAL

## 2018-08-27 ENCOUNTER — HOSPITAL ENCOUNTER (OUTPATIENT)
Dept: GENERAL RADIOLOGY | Age: 53
Discharge: HOME | End: 2018-08-27
Attending: PODIATRIST
Payer: COMMERCIAL

## 2018-08-27 DIAGNOSIS — M72.2 PLANTAR FASCIITIS OF LEFT FOOT: ICD-10-CM

## 2018-08-27 DIAGNOSIS — M20.12 VALGUS DEFORMITY OF BOTH GREAT TOES: ICD-10-CM

## 2018-08-27 DIAGNOSIS — M21.42 PES PLANUS OF BOTH FEET: ICD-10-CM

## 2018-08-27 DIAGNOSIS — M21.41 PES PLANUS OF BOTH FEET: ICD-10-CM

## 2018-08-27 DIAGNOSIS — M20.11 VALGUS DEFORMITY OF BOTH GREAT TOES: ICD-10-CM

## 2018-08-27 DIAGNOSIS — M20.11 VALGUS DEFORMITY OF BOTH GREAT TOES: Primary | ICD-10-CM

## 2018-08-27 DIAGNOSIS — M20.12 VALGUS DEFORMITY OF BOTH GREAT TOES: Primary | ICD-10-CM

## 2018-08-27 DIAGNOSIS — M77.32 CALCANEAL SPUR OF LEFT FOOT: ICD-10-CM

## 2018-08-27 PROCEDURE — 99203 OFFICE O/P NEW LOW 30 MIN: CPT | Performed by: PODIATRIST

## 2018-08-27 PROCEDURE — 73620 X-RAY EXAM OF FOOT: CPT | Performed by: PODIATRIST

## 2018-08-27 RX ORDER — OMEGA-3 FATTY ACIDS/FISH OIL 300-1000MG
200 CAPSULE ORAL
COMMUNITY
End: 2019-04-15

## 2018-08-28 NOTE — PROGRESS NOTES
Annette Ramos is a 46year old female. Patient presents with:  Consult: left heel pain -- Onset 6 mths and denies injuries. No xrays taken. Pain is sporadic. Rates pain 0/10.          HPI:   This very pleasant patient presents to the clinic with a chief Exam  GENERAL: well developed, well nourished, in no apparent distress  EXTREMITIES:   1. Integument: Her skin is warm dry and supple she has an enlarged medial eminence of the first metatarsal head bilateral feet but is currently not painful.    2. Vascula

## 2018-09-11 ENCOUNTER — OFFICE VISIT (OUTPATIENT)
Dept: PHYSICAL THERAPY | Age: 53
End: 2018-09-11
Attending: PODIATRIST
Payer: COMMERCIAL

## 2018-09-11 DIAGNOSIS — M72.2 PLANTAR FASCIITIS OF LEFT FOOT: ICD-10-CM

## 2018-09-11 DIAGNOSIS — M21.41 PES PLANUS OF BOTH FEET: ICD-10-CM

## 2018-09-11 DIAGNOSIS — M21.42 PES PLANUS OF BOTH FEET: ICD-10-CM

## 2018-09-11 DIAGNOSIS — M77.32 CALCANEAL SPUR OF LEFT FOOT: ICD-10-CM

## 2018-09-11 PROCEDURE — 97161 PT EVAL LOW COMPLEX 20 MIN: CPT | Performed by: PHYSICAL THERAPIST

## 2018-09-11 PROCEDURE — 97110 THERAPEUTIC EXERCISES: CPT | Performed by: PHYSICAL THERAPIST

## 2018-09-11 NOTE — PROGRESS NOTES
LOWER EXTREMITY EVALUATION:   Referring Physician: Dr. Juan Medeiros  Diagnosis: Plantar fasciitis of left foot (M72.2)  Pes planus of both feet (M21.41,M21.42)  Calcaneal spur of left foot (M77.32)      Date of Onset: March 2018 Date of Evaluation: 9/11/2018 Ankle DF           Ankle PF      Inversion      Eversion      EHL (L5)                          Strength    Right Left Comments   Ankle DF    5/5   5/5    Ankle PF 4+/5 4/5    Inversion 5/5 5/5    Eversion 5/5 5/5    EHL (L5)      Hip flexion      Hip Ab and while under my care.     X___________________________________________________ Date____________________    Certification From: 1/23/0139  To:12/10/2018

## 2018-09-12 ENCOUNTER — APPOINTMENT (OUTPATIENT)
Dept: PHYSICAL THERAPY | Age: 53
End: 2018-09-12
Attending: PODIATRIST
Payer: COMMERCIAL

## 2018-09-19 ENCOUNTER — APPOINTMENT (OUTPATIENT)
Dept: PHYSICAL THERAPY | Age: 53
End: 2018-09-19
Attending: PODIATRIST
Payer: COMMERCIAL

## 2018-09-21 ENCOUNTER — APPOINTMENT (OUTPATIENT)
Dept: PHYSICAL THERAPY | Age: 53
End: 2018-09-21
Attending: PODIATRIST
Payer: COMMERCIAL

## 2018-09-24 ENCOUNTER — APPOINTMENT (OUTPATIENT)
Dept: PHYSICAL THERAPY | Age: 53
End: 2018-09-24
Attending: PODIATRIST
Payer: COMMERCIAL

## 2018-09-26 ENCOUNTER — APPOINTMENT (OUTPATIENT)
Dept: PHYSICAL THERAPY | Age: 53
End: 2018-09-26
Attending: PODIATRIST
Payer: COMMERCIAL

## 2018-10-01 ENCOUNTER — APPOINTMENT (OUTPATIENT)
Dept: PHYSICAL THERAPY | Age: 53
End: 2018-10-01
Attending: PODIATRIST
Payer: COMMERCIAL

## 2018-10-02 ENCOUNTER — APPOINTMENT (OUTPATIENT)
Dept: PHYSICAL THERAPY | Age: 53
End: 2018-10-02
Attending: PODIATRIST
Payer: COMMERCIAL

## 2018-11-16 ENCOUNTER — HOSPITAL ENCOUNTER (OUTPATIENT)
Dept: MAMMOGRAPHY | Age: 53
Discharge: HOME | End: 2018-11-16
Attending: INTERNAL MEDICINE
Payer: COMMERCIAL

## 2018-11-16 DIAGNOSIS — Z12.39 ENCOUNTER FOR SCREENING FOR MALIGNANT NEOPLASM OF BREAST: ICD-10-CM

## 2018-11-16 PROCEDURE — 77067 SCR MAMMO BI INCL CAD: CPT | Performed by: INTERNAL MEDICINE

## 2018-11-16 PROCEDURE — 77063 BREAST TOMOSYNTHESIS BI: CPT | Performed by: INTERNAL MEDICINE

## 2019-01-11 ENCOUNTER — PRIOR ORIGINAL RECORDS (OUTPATIENT)
Dept: OTHER | Age: 54
End: 2019-01-11

## 2019-01-18 ENCOUNTER — PRIOR ORIGINAL RECORDS (OUTPATIENT)
Dept: OTHER | Age: 54
End: 2019-01-18

## 2019-01-23 ENCOUNTER — PRIOR ORIGINAL RECORDS (OUTPATIENT)
Dept: OTHER | Age: 54
End: 2019-01-23

## 2019-01-28 ENCOUNTER — PRIOR ORIGINAL RECORDS (OUTPATIENT)
Dept: OTHER | Age: 54
End: 2019-01-28

## 2019-03-04 VITALS
HEIGHT: 63 IN | WEIGHT: 201 LBS | SYSTOLIC BLOOD PRESSURE: 94 MMHG | BODY MASS INDEX: 35.61 KG/M2 | HEART RATE: 57 BPM | DIASTOLIC BLOOD PRESSURE: 62 MMHG

## 2019-03-05 ENCOUNTER — LAB ENCOUNTER (OUTPATIENT)
Dept: LAB | Age: 54
End: 2019-03-05
Attending: SPECIALIST
Payer: COMMERCIAL

## 2019-03-05 DIAGNOSIS — I10 HTN (HYPERTENSION): ICD-10-CM

## 2019-03-05 DIAGNOSIS — I49.3 PVC (PREMATURE VENTRICULAR CONTRACTION): ICD-10-CM

## 2019-03-05 DIAGNOSIS — E78.5 HYPERLIPIDEMIA: ICD-10-CM

## 2019-03-05 DIAGNOSIS — Z79.899 HIGH RISK MEDICATION USE: Primary | ICD-10-CM

## 2019-03-05 DIAGNOSIS — I42.9 CARDIOMYOPATHY (HCC): ICD-10-CM

## 2019-03-05 DIAGNOSIS — R00.2 PALPITATIONS: ICD-10-CM

## 2019-03-05 LAB
ALBUMIN SERPL-MCNC: 3.8 G/DL (ref 3.4–5)
ALBUMIN/GLOB SERPL: 1.1 {RATIO} (ref 1–2)
ALP LIVER SERPL-CCNC: 78 U/L (ref 41–108)
ALT SERPL-CCNC: 23 U/L (ref 13–56)
ANION GAP SERPL CALC-SCNC: 5 MMOL/L (ref 0–18)
AST SERPL-CCNC: 19 U/L (ref 15–37)
BASOPHILS # BLD AUTO: 0.04 X10(3) UL (ref 0–0.2)
BASOPHILS NFR BLD AUTO: 0.8 %
BILIRUB SERPL-MCNC: 0.4 MG/DL (ref 0.1–2)
BUN BLD-MCNC: 18 MG/DL (ref 7–18)
BUN/CREAT SERPL: 22.2 (ref 10–20)
CALCIUM BLD-MCNC: 9.6 MG/DL (ref 8.5–10.1)
CHLORIDE SERPL-SCNC: 109 MMOL/L (ref 98–107)
CHOLEST SMN-MCNC: 173 MG/DL (ref ?–200)
CO2 SERPL-SCNC: 26 MMOL/L (ref 21–32)
CREAT BLD-MCNC: 0.81 MG/DL (ref 0.55–1.02)
DEPRECATED RDW RBC AUTO: 43.3 FL (ref 35.1–46.3)
EOSINOPHIL # BLD AUTO: 0.11 X10(3) UL (ref 0–0.7)
EOSINOPHIL NFR BLD AUTO: 2.2 %
ERYTHROCYTE [DISTWIDTH] IN BLOOD BY AUTOMATED COUNT: 13.2 % (ref 11–15)
GLOBULIN PLAS-MCNC: 3.4 G/DL (ref 2.8–4.4)
GLUCOSE BLD-MCNC: 90 MG/DL (ref 70–99)
HAV IGM SER QL: 2.2 MG/DL (ref 1.6–2.6)
HCT VFR BLD AUTO: 40.5 % (ref 35–48)
HDLC SERPL-MCNC: 56 MG/DL (ref 40–59)
HGB BLD-MCNC: 13.2 G/DL (ref 12–16)
IMM GRANULOCYTES # BLD AUTO: 0.01 X10(3) UL (ref 0–1)
IMM GRANULOCYTES NFR BLD: 0.2 %
LDLC SERPL CALC-MCNC: 95 MG/DL (ref ?–100)
LYMPHOCYTES # BLD AUTO: 1.37 X10(3) UL (ref 1–4)
LYMPHOCYTES NFR BLD AUTO: 27.3 %
M PROTEIN MFR SERPL ELPH: 7.2 G/DL (ref 6.4–8.2)
MCH RBC QN AUTO: 29.3 PG (ref 26–34)
MCHC RBC AUTO-ENTMCNC: 32.6 G/DL (ref 31–37)
MCV RBC AUTO: 90 FL (ref 80–100)
MONOCYTES # BLD AUTO: 0.33 X10(3) UL (ref 0.1–1)
MONOCYTES NFR BLD AUTO: 6.6 %
NEUTROPHILS # BLD AUTO: 3.15 X10 (3) UL (ref 1.5–7.7)
NEUTROPHILS # BLD AUTO: 3.15 X10(3) UL (ref 1.5–7.7)
NEUTROPHILS NFR BLD AUTO: 62.9 %
NONHDLC SERPL-MCNC: 117 MG/DL (ref ?–130)
OSMOLALITY SERPL CALC.SUM OF ELEC: 291 MOSM/KG (ref 275–295)
PLATELET # BLD AUTO: 267 10(3)UL (ref 150–450)
POTASSIUM SERPL-SCNC: 4.8 MMOL/L (ref 3.5–5.1)
RBC # BLD AUTO: 4.5 X10(6)UL (ref 3.8–5.3)
SODIUM SERPL-SCNC: 140 MMOL/L (ref 136–145)
TRIGL SERPL-MCNC: 111 MG/DL (ref 30–149)
TSI SER-ACNC: 2.35 MIU/ML (ref 0.36–3.74)
VLDLC SERPL CALC-MCNC: 22 MG/DL (ref 0–30)
WBC # BLD AUTO: 5 X10(3) UL (ref 4–11)

## 2019-03-05 PROCEDURE — 83735 ASSAY OF MAGNESIUM: CPT

## 2019-03-05 PROCEDURE — 84443 ASSAY THYROID STIM HORMONE: CPT

## 2019-03-05 PROCEDURE — 85025 COMPLETE CBC W/AUTO DIFF WBC: CPT

## 2019-03-05 PROCEDURE — 80053 COMPREHEN METABOLIC PANEL: CPT

## 2019-03-05 PROCEDURE — 80061 LIPID PANEL: CPT

## 2019-03-05 PROCEDURE — 36415 COLL VENOUS BLD VENIPUNCTURE: CPT

## 2019-10-08 ENCOUNTER — EXTERNAL RECORD (OUTPATIENT)
Dept: HEALTH INFORMATION MANAGEMENT | Facility: OTHER | Age: 54
End: 2019-10-08

## 2019-12-19 ENCOUNTER — HOSPITAL ENCOUNTER (OUTPATIENT)
Dept: MAMMOGRAPHY | Age: 54
Discharge: HOME | End: 2019-12-19
Attending: INTERNAL MEDICINE
Payer: COMMERCIAL

## 2019-12-19 DIAGNOSIS — Z12.31 OTHER SCREENING MAMMOGRAM: ICD-10-CM

## 2019-12-19 PROCEDURE — 77063 BREAST TOMOSYNTHESIS BI: CPT | Performed by: INTERNAL MEDICINE

## 2019-12-19 PROCEDURE — 77067 SCR MAMMO BI INCL CAD: CPT | Performed by: INTERNAL MEDICINE

## 2020-03-08 ENCOUNTER — HOSPITAL ENCOUNTER (EMERGENCY)
Facility: HOSPITAL | Age: 55
Discharge: HOME | End: 2020-03-08
Attending: EMERGENCY MEDICINE
Payer: COMMERCIAL

## 2020-03-08 ENCOUNTER — APPOINTMENT (OUTPATIENT)
Dept: GENERAL RADIOLOGY | Facility: HOSPITAL | Age: 55
End: 2020-03-08
Attending: EMERGENCY MEDICINE
Payer: COMMERCIAL

## 2020-03-08 VITALS
SYSTOLIC BLOOD PRESSURE: 124 MMHG | RESPIRATION RATE: 14 BRPM | BODY MASS INDEX: 33.29 KG/M2 | HEIGHT: 64 IN | WEIGHT: 195 LBS | HEART RATE: 59 BPM | DIASTOLIC BLOOD PRESSURE: 65 MMHG | OXYGEN SATURATION: 100 % | TEMPERATURE: 97 F

## 2020-03-08 DIAGNOSIS — E86.0 DEHYDRATION: ICD-10-CM

## 2020-03-08 DIAGNOSIS — R19.7 DIARRHEA, UNSPECIFIED TYPE: Primary | ICD-10-CM

## 2020-03-08 LAB
ANION GAP SERPL CALC-SCNC: 5 MMOL/L (ref 0–18)
BASOPHILS # BLD AUTO: 0.04 X10(3) UL (ref 0–0.2)
BASOPHILS NFR BLD AUTO: 0.9 %
BUN BLD-MCNC: 16 MG/DL (ref 7–18)
BUN/CREAT SERPL: 16.3 (ref 10–20)
CALCIUM BLD-MCNC: 9.5 MG/DL (ref 8.5–10.1)
CHLORIDE SERPL-SCNC: 110 MMOL/L (ref 98–112)
CO2 SERPL-SCNC: 26 MMOL/L (ref 21–32)
CREAT BLD-MCNC: 0.98 MG/DL (ref 0.55–1.02)
DEPRECATED RDW RBC AUTO: 41.9 FL (ref 35.1–46.3)
EOSINOPHIL # BLD AUTO: 0.04 X10(3) UL (ref 0–0.7)
EOSINOPHIL NFR BLD AUTO: 0.9 %
ERYTHROCYTE [DISTWIDTH] IN BLOOD BY AUTOMATED COUNT: 12.9 % (ref 11–15)
GLUCOSE BLD-MCNC: 109 MG/DL (ref 70–99)
HCT VFR BLD AUTO: 43.8 % (ref 35–48)
HGB BLD-MCNC: 14.2 G/DL (ref 12–16)
IMM GRANULOCYTES # BLD AUTO: 0.01 X10(3) UL (ref 0–1)
IMM GRANULOCYTES NFR BLD: 0.2 %
LYMPHOCYTES # BLD AUTO: 1.6 X10(3) UL (ref 1–4)
LYMPHOCYTES NFR BLD AUTO: 36 %
MCH RBC QN AUTO: 28.9 PG (ref 26–34)
MCHC RBC AUTO-ENTMCNC: 32.4 G/DL (ref 31–37)
MCV RBC AUTO: 89.2 FL (ref 80–100)
MONOCYTES # BLD AUTO: 0.34 X10(3) UL (ref 0.1–1)
MONOCYTES NFR BLD AUTO: 7.7 %
NEUTROPHILS # BLD AUTO: 2.41 X10 (3) UL (ref 1.5–7.7)
NEUTROPHILS # BLD AUTO: 2.41 X10(3) UL (ref 1.5–7.7)
NEUTROPHILS NFR BLD AUTO: 54.3 %
NT-PROBNP SERPL-MCNC: 56 PG/ML (ref ?–125)
OSMOLALITY SERPL CALC.SUM OF ELEC: 294 MOSM/KG (ref 275–295)
PLATELET # BLD AUTO: 237 10(3)UL (ref 150–450)
POTASSIUM SERPL-SCNC: 4.2 MMOL/L (ref 3.5–5.1)
RBC # BLD AUTO: 4.91 X10(6)UL (ref 3.8–5.3)
SODIUM SERPL-SCNC: 141 MMOL/L (ref 136–145)
TROPONIN I SERPL-MCNC: <0.045 NG/ML (ref ?–0.04)
WBC # BLD AUTO: 4.4 X10(3) UL (ref 4–11)

## 2020-03-08 PROCEDURE — 85025 COMPLETE CBC W/AUTO DIFF WBC: CPT | Performed by: EMERGENCY MEDICINE

## 2020-03-08 PROCEDURE — 84484 ASSAY OF TROPONIN QUANT: CPT | Performed by: EMERGENCY MEDICINE

## 2020-03-08 PROCEDURE — 80048 BASIC METABOLIC PNL TOTAL CA: CPT | Performed by: EMERGENCY MEDICINE

## 2020-03-08 PROCEDURE — 93005 ELECTROCARDIOGRAM TRACING: CPT

## 2020-03-08 PROCEDURE — 93010 ELECTROCARDIOGRAM REPORT: CPT | Performed by: EMERGENCY MEDICINE

## 2020-03-08 PROCEDURE — 83880 ASSAY OF NATRIURETIC PEPTIDE: CPT | Performed by: EMERGENCY MEDICINE

## 2020-03-08 PROCEDURE — 96360 HYDRATION IV INFUSION INIT: CPT

## 2020-03-08 PROCEDURE — 99284 EMERGENCY DEPT VISIT MOD MDM: CPT

## 2020-03-08 PROCEDURE — 71045 X-RAY EXAM CHEST 1 VIEW: CPT | Performed by: EMERGENCY MEDICINE

## 2020-03-08 RX ORDER — SODIUM CHLORIDE 9 MG/ML
INJECTION, SOLUTION INTRAVENOUS CONTINUOUS
Status: DISCONTINUED | OUTPATIENT
Start: 2020-03-08 | End: 2020-03-08

## 2020-03-08 NOTE — ED INITIAL ASSESSMENT (HPI)
Pt presents to ED to ED with lightheaded , dizzy Hx of cardiomyopathy, states BP was low this am and generally does not feel well.

## 2020-03-08 NOTE — ED NOTES
Patient states she felt \"off\" and her bp andhr were low when checking this am. States she had 3 episodes of diarrhea this am.

## 2020-03-09 NOTE — ED PROVIDER NOTES
Patient Seen in: [facility] AND [facility] Emergency Department    History   Patient presents with:  Dizziness    Stated Complaint: hx of cardiomyopathy, dizzy    HPI    Patient complains of diarrhea, this began this morning, non bloody, no  abd pain.   no vomiti Ht 162.6 cm (5' 4\")   Wt 88.5 kg   LMP 04/29/2005   SpO2 100%   BMI 33.47 kg/m²   PULSE OX Nl on room air    GENERAL: awake alert non toxic  HEAD: normocephalic, atraumatic,   EYES: PERRLA, EOMI, conj sclera clear  THROAT: mmm, no lesions  NECK: supple, n 1:54 pm    Follow-up:  No follow-up provider specified.       Medications Prescribed:  Discharge Medication List as of 3/8/2020  1:57 PM                  Disposition and Plan     Clinical Impression:  Diarrhea, unspecified type  (primary encounter diagnosis

## 2020-03-18 ENCOUNTER — HOSPITAL ENCOUNTER (OUTPATIENT)
Dept: MAMMOGRAPHY | Facility: HOSPITAL | Age: 55
Discharge: HOME | End: 2020-03-18
Attending: INTERNAL MEDICINE
Payer: COMMERCIAL

## 2020-03-18 ENCOUNTER — HOSPITAL ENCOUNTER (OUTPATIENT)
Dept: ULTRASOUND IMAGING | Facility: HOSPITAL | Age: 55
Discharge: HOME | End: 2020-03-18
Attending: INTERNAL MEDICINE
Payer: COMMERCIAL

## 2020-03-18 DIAGNOSIS — N64.4 BREAST PAIN: ICD-10-CM

## 2020-03-18 PROCEDURE — 76642 ULTRASOUND BREAST LIMITED: CPT | Performed by: INTERNAL MEDICINE

## 2020-03-18 PROCEDURE — 77061 BREAST TOMOSYNTHESIS UNI: CPT | Performed by: INTERNAL MEDICINE

## 2020-03-18 PROCEDURE — 77065 DX MAMMO INCL CAD UNI: CPT | Performed by: INTERNAL MEDICINE

## 2020-09-08 ENCOUNTER — OFFICE VISIT (OUTPATIENT)
Dept: OTOLARYNGOLOGY | Facility: CLINIC | Age: 55
End: 2020-09-08
Payer: COMMERCIAL

## 2020-09-08 ENCOUNTER — OFFICE VISIT (OUTPATIENT)
Dept: AUDIOLOGY | Facility: CLINIC | Age: 55
End: 2020-09-08
Payer: COMMERCIAL

## 2020-09-08 VITALS
TEMPERATURE: 98 F | BODY MASS INDEX: 35.44 KG/M2 | HEIGHT: 63 IN | SYSTOLIC BLOOD PRESSURE: 120 MMHG | WEIGHT: 200 LBS | HEART RATE: 83 BPM | DIASTOLIC BLOOD PRESSURE: 83 MMHG

## 2020-09-08 DIAGNOSIS — R42 DIZZINESS: Primary | ICD-10-CM

## 2020-09-08 PROCEDURE — 92557 COMPREHENSIVE HEARING TEST: CPT | Performed by: AUDIOLOGIST

## 2020-09-08 PROCEDURE — 3079F DIAST BP 80-89 MM HG: CPT | Performed by: OTOLARYNGOLOGY

## 2020-09-08 PROCEDURE — 99203 OFFICE O/P NEW LOW 30 MIN: CPT | Performed by: OTOLARYNGOLOGY

## 2020-09-08 PROCEDURE — 3008F BODY MASS INDEX DOCD: CPT | Performed by: OTOLARYNGOLOGY

## 2020-09-08 PROCEDURE — 92567 TYMPANOMETRY: CPT | Performed by: AUDIOLOGIST

## 2020-09-08 PROCEDURE — 3074F SYST BP LT 130 MM HG: CPT | Performed by: OTOLARYNGOLOGY

## 2020-09-08 NOTE — PROGRESS NOTES
Cherre Seip is a 63year old female.  Patient presents with:  Dizziness: per pt duing episodes pt feels dizzy, when she lays down or turns head   Ear Problem: feels fullness in ears, itchy     HPI:   For the last few weeks she has been experiencing pro Normal. Cranial nerves - Cranial nerves II through XII grossly intact.    Positive Hallpike with right ear down   Neck Exam Normal Inspection - Normal. Palpation - Normal. Parotid gland - Normal. Thyroid gland - Normal.   Psychiatric Normal Orientation - Or

## 2020-09-08 NOTE — PROGRESS NOTES
AUDIOLOGY REPORT      Bonnie Hernadez is a 63year old female     Referring Provider: Blu Walker   YOB: 1965  Medical Record: [id number]      Patient Hearing History:   Patienat reprorted dizziness     Otoscopic Inspection:  Right ear:

## 2020-10-01 ENCOUNTER — PATIENT MESSAGE (OUTPATIENT)
Dept: OTOLARYNGOLOGY | Facility: CLINIC | Age: 55
End: 2020-10-01

## 2020-10-01 DIAGNOSIS — R42 DIZZINESS: Primary | ICD-10-CM

## 2020-10-02 NOTE — TELEPHONE ENCOUNTER
From: Annette Ramos  To: Rodney Davis MD  Sent: 10/1/2020 5:54 PM CDT  Subject: Visit Follow-up Question    Dr. Nakia Davis,    Could you put an order for me to take a little PT for my inner ear issue?  I have done the at home exercises and I'm a little bet

## 2020-10-15 ENCOUNTER — EXTERNAL RECORD (OUTPATIENT)
Dept: HEALTH INFORMATION MANAGEMENT | Facility: OTHER | Age: 55
End: 2020-10-15

## 2020-11-03 ENCOUNTER — OFFICE VISIT (OUTPATIENT)
Dept: PHYSICAL THERAPY | Facility: HOSPITAL | Age: 55
End: 2020-11-03
Attending: OTOLARYNGOLOGY
Payer: COMMERCIAL

## 2020-11-03 DIAGNOSIS — R42 DIZZINESS: ICD-10-CM

## 2020-11-03 PROCEDURE — 95992 CANALITH REPOSITIONING PROC: CPT

## 2020-11-03 PROCEDURE — 97161 PT EVAL LOW COMPLEX 20 MIN: CPT

## 2020-11-03 NOTE — PROGRESS NOTES
VESTIBULAR EVALUATION:   Referring Physician: Suma Stoll V  Diagnosis: Dizziness, BPPV     Date of Service: 11/3/2020   Insurance:  [employer] [facility]      PATIENT SUMMARY   Isidro Underwood is a 63year old female who presents to therapy today with reports of p evaluation findings, pathology and management of BPPV, plan of care with pt. Pt. Was issued written info re: BPPV. Skilled Physical Therapy is medically necessary to address the above impairments and reach functional goals.     Precautions:  None  OBJECTIV BPPV.    Charges: PT Eval Low Complexity, CRM      Total Timed Treatment: 40 min     Total Treatment Time: 40 min     PLAN OF CARE:    Goals: (to be met in 8 visits)  1. Negative Maggie-Hallpike and roll tests.   2.  Able to perform position changes without d

## 2020-11-06 ENCOUNTER — APPOINTMENT (OUTPATIENT)
Dept: PHYSICAL THERAPY | Facility: HOSPITAL | Age: 55
End: 2020-11-06
Attending: OTOLARYNGOLOGY
Payer: COMMERCIAL

## 2020-11-10 ENCOUNTER — TELEPHONE (OUTPATIENT)
Dept: PHYSICAL THERAPY | Age: 55
End: 2020-11-10

## 2020-11-12 ENCOUNTER — TELEPHONE (OUTPATIENT)
Dept: INTERNAL MEDICINE CLINIC | Facility: HOSPITAL | Age: 55
End: 2020-11-12

## 2020-11-12 ENCOUNTER — APPOINTMENT (OUTPATIENT)
Dept: PHYSICAL THERAPY | Facility: HOSPITAL | Age: 55
End: 2020-11-12
Attending: OTOLARYNGOLOGY
Payer: COMMERCIAL

## 2020-11-12 DIAGNOSIS — Z20.822 SUSPECTED 2019 NOVEL CORONAVIRUS INFECTION: Primary | ICD-10-CM

## 2020-11-13 ENCOUNTER — LAB ENCOUNTER (OUTPATIENT)
Dept: LAB | Age: 55
End: 2020-11-13
Attending: PREVENTIVE MEDICINE
Payer: COMMERCIAL

## 2020-11-13 DIAGNOSIS — Z20.822 SUSPECTED 2019 NOVEL CORONAVIRUS INFECTION: ICD-10-CM

## 2020-11-19 ENCOUNTER — APPOINTMENT (OUTPATIENT)
Dept: PHYSICAL THERAPY | Facility: HOSPITAL | Age: 55
End: 2020-11-19
Attending: OTOLARYNGOLOGY
Payer: COMMERCIAL

## 2020-11-25 ENCOUNTER — APPOINTMENT (OUTPATIENT)
Dept: PHYSICAL THERAPY | Facility: HOSPITAL | Age: 55
End: 2020-11-25
Attending: OTOLARYNGOLOGY
Payer: COMMERCIAL

## 2020-12-01 ENCOUNTER — TELEPHONE (OUTPATIENT)
Dept: PHYSICAL THERAPY | Facility: HOSPITAL | Age: 55
End: 2020-12-01

## 2020-12-01 ENCOUNTER — APPOINTMENT (OUTPATIENT)
Dept: PHYSICAL THERAPY | Facility: HOSPITAL | Age: 55
End: 2020-12-01
Attending: OTOLARYNGOLOGY

## 2020-12-10 ENCOUNTER — APPOINTMENT (OUTPATIENT)
Dept: PHYSICAL THERAPY | Facility: HOSPITAL | Age: 55
End: 2020-12-10
Attending: OTOLARYNGOLOGY

## 2020-12-15 ENCOUNTER — APPOINTMENT (OUTPATIENT)
Dept: PHYSICAL THERAPY | Facility: HOSPITAL | Age: 55
End: 2020-12-15
Attending: OTOLARYNGOLOGY

## 2020-12-17 ENCOUNTER — IMMUNIZATION (OUTPATIENT)
Dept: LAB | Facility: HOSPITAL | Age: 55
End: 2020-12-17
Attending: PREVENTIVE MEDICINE
Payer: COMMERCIAL

## 2020-12-17 DIAGNOSIS — Z23 NEED FOR VACCINATION: ICD-10-CM

## 2020-12-17 PROCEDURE — 0001A PFIZER-BIONTECH COVID-19 VACCINE: CPT

## 2020-12-22 ENCOUNTER — APPOINTMENT (OUTPATIENT)
Dept: PHYSICAL THERAPY | Facility: HOSPITAL | Age: 55
End: 2020-12-22
Attending: OTOLARYNGOLOGY

## 2020-12-29 ENCOUNTER — APPOINTMENT (OUTPATIENT)
Dept: PHYSICAL THERAPY | Facility: HOSPITAL | Age: 55
End: 2020-12-29
Attending: OTOLARYNGOLOGY

## 2021-01-07 ENCOUNTER — IMMUNIZATION (OUTPATIENT)
Dept: LAB | Facility: HOSPITAL | Age: 56
End: 2021-01-07
Attending: PREVENTIVE MEDICINE

## 2021-01-07 DIAGNOSIS — Z23 NEED FOR VACCINATION: ICD-10-CM

## 2021-01-07 PROCEDURE — 0002A SARSCOV2 VAC 30MCG/0.3ML IM: CPT

## 2021-03-04 ENCOUNTER — HOSPITAL ENCOUNTER (OUTPATIENT)
Dept: CV DIAGNOSTICS | Facility: HOSPITAL | Age: 56
Discharge: HOME | End: 2021-03-04
Attending: SPECIALIST
Payer: COMMERCIAL

## 2021-03-04 DIAGNOSIS — I42.9 CARDIOMYOPATHY (HCC): ICD-10-CM

## 2021-03-04 PROCEDURE — 93306 TTE W/DOPPLER COMPLETE: CPT | Performed by: SPECIALIST

## 2021-06-30 ENCOUNTER — HOSPITAL ENCOUNTER (OUTPATIENT)
Dept: MAMMOGRAPHY | Facility: HOSPITAL | Age: 56
Discharge: HOME | End: 2021-06-30
Attending: INTERNAL MEDICINE
Payer: COMMERCIAL

## 2021-06-30 DIAGNOSIS — Z12.31 ENCOUNTER FOR SCREENING MAMMOGRAM FOR MALIGNANT NEOPLASM OF BREAST: ICD-10-CM

## 2021-06-30 DIAGNOSIS — Z12.31 ENCOUNTER FOR MAMMOGRAM TO ESTABLISH BASELINE MAMMOGRAM: ICD-10-CM

## 2021-06-30 PROCEDURE — 77063 BREAST TOMOSYNTHESIS BI: CPT | Performed by: INTERNAL MEDICINE

## 2021-06-30 PROCEDURE — 77067 SCR MAMMO BI INCL CAD: CPT | Performed by: INTERNAL MEDICINE

## 2021-11-12 ENCOUNTER — HOSPITAL ENCOUNTER (EMERGENCY)
Facility: HOSPITAL | Age: 56
Discharge: HOME | End: 2021-11-12
Payer: COMMERCIAL

## 2021-11-12 ENCOUNTER — APPOINTMENT (OUTPATIENT)
Dept: ULTRASOUND IMAGING | Facility: HOSPITAL | Age: 56
End: 2021-11-12
Payer: COMMERCIAL

## 2021-11-12 ENCOUNTER — APPOINTMENT (OUTPATIENT)
Dept: GENERAL RADIOLOGY | Facility: HOSPITAL | Age: 56
End: 2021-11-12
Attending: NURSE PRACTITIONER
Payer: COMMERCIAL

## 2021-11-12 VITALS
HEART RATE: 72 BPM | RESPIRATION RATE: 16 BRPM | DIASTOLIC BLOOD PRESSURE: 69 MMHG | TEMPERATURE: 99 F | SYSTOLIC BLOOD PRESSURE: 123 MMHG | OXYGEN SATURATION: 100 %

## 2021-11-12 DIAGNOSIS — I82.412 ACUTE DEEP VEIN THROMBOSIS (DVT) OF FEMORAL VEIN OF LEFT LOWER EXTREMITY (HCC): Primary | ICD-10-CM

## 2021-11-12 PROCEDURE — 93971 EXTREMITY STUDY: CPT

## 2021-11-12 PROCEDURE — 80053 COMPREHEN METABOLIC PANEL: CPT | Performed by: NURSE PRACTITIONER

## 2021-11-12 PROCEDURE — 85730 THROMBOPLASTIN TIME PARTIAL: CPT | Performed by: NURSE PRACTITIONER

## 2021-11-12 PROCEDURE — 99284 EMERGENCY DEPT VISIT MOD MDM: CPT

## 2021-11-12 PROCEDURE — 85610 PROTHROMBIN TIME: CPT

## 2021-11-12 PROCEDURE — 36415 COLL VENOUS BLD VENIPUNCTURE: CPT

## 2021-11-12 PROCEDURE — 85025 COMPLETE CBC W/AUTO DIFF WBC: CPT

## 2021-11-12 NOTE — ED PROVIDER NOTES
Vascular  Patient Seen in: [facility] AND [facility] Emergency Department      History   Patient presents with:  Leg Pain    Stated Complaint: groin pain    Subjective:   HPI    50-year-old female with history of hyperlipidemia, cardiomyopathy here with complains Physical Exam  Constitutional:       General: She is not in acute distress. HENT:      Head: Normocephalic and atraumatic. Eyes:      Extraocular Movements: Extraocular movements intact.       Pupils: Pupils are equal, round, and reactive to ligh greater saphenous vein of the calf and thigh which extends into the common femoral vein proximally where there is deep venous thrombosis.  This is nonocclusive at the common femoral vein.  The femoral and   popliteal veins are grossly patent.  See above.

## 2021-11-12 NOTE — ED INITIAL ASSESSMENT (HPI)
PT with left calf and mid leg pain had an US 10/27 with no evidence of DVT but did have a superficial thrombophlebitis within the left calf. Pt now with pain radiating up to groin area. Sent by PCP for reevaluation.

## 2021-11-19 NOTE — ED NOTES
Pt to CT scan via cart. What Type Of Note Output Would You Prefer (Optional)?: Standard Output What Is The Reason For Today's Visit?: Skin Lesions What Is The Reason For Today's Visit? (Being Monitored For X): the development of a new lesion

## 2021-11-22 ENCOUNTER — EXTERNAL RECORD (OUTPATIENT)
Dept: HEALTH INFORMATION MANAGEMENT | Facility: OTHER | Age: 56
End: 2021-11-22

## 2021-11-22 ENCOUNTER — LAB ENCOUNTER (OUTPATIENT)
Dept: LAB | Facility: HOSPITAL | Age: 56
End: 2021-11-22
Attending: INTERNAL MEDICINE
Payer: COMMERCIAL

## 2021-11-22 DIAGNOSIS — Z13.220 SCREENING, LIPID: ICD-10-CM

## 2021-11-22 PROCEDURE — 36415 COLL VENOUS BLD VENIPUNCTURE: CPT

## 2021-11-22 PROCEDURE — 80061 LIPID PANEL: CPT

## 2021-12-01 ENCOUNTER — APPOINTMENT (OUTPATIENT)
Dept: HEMATOLOGY/ONCOLOGY | Facility: HOSPITAL | Age: 56
End: 2021-12-01
Attending: INTERNAL MEDICINE
Payer: COMMERCIAL

## 2021-12-01 VITALS
OXYGEN SATURATION: 99 % | SYSTOLIC BLOOD PRESSURE: 98 MMHG | DIASTOLIC BLOOD PRESSURE: 65 MMHG | HEIGHT: 64 IN | WEIGHT: 201.38 LBS | RESPIRATION RATE: 16 BRPM | HEART RATE: 71 BPM | BODY MASS INDEX: 34.38 KG/M2 | TEMPERATURE: 99 F

## 2021-12-01 DIAGNOSIS — I82.402 ACUTE DEEP VEIN THROMBOSIS (DVT) OF LEFT LOWER EXTREMITY, UNSPECIFIED VEIN (HCC): Primary | ICD-10-CM

## 2021-12-01 PROCEDURE — 99244 OFF/OP CNSLTJ NEW/EST MOD 40: CPT | Performed by: INTERNAL MEDICINE

## 2021-12-01 NOTE — PROGRESS NOTES
Hematology Consultation Note    Patient Name: Austin Mock   YOB: 1965   Medical Record Number: [id number]   CSN: [id number]   Consulting Physician: Lillie Wolfe MD  Referring Physician(s): Nichole Herndon  Date of Consultation: 12/ bleeding or bruising complications or financial toxicity associated with apixaban. Her ROS is otherwise negative.     Past Medical History:  Past Medical History:   Diagnosis Date   • Cardiomyopathy [facility])    • Hyperlipidemia    • [facility] 11[facility]12[facility]2 Medications:  apixaban 5 MG Oral Tab, Take 1 tablet (5 mg total) by mouth 2 (two) times daily. , Disp: 60 tablet, Rfl: 5        Review of Systems:    Constitutional: Negative for anorexia, chills, fatigue, fevers, night sweats and weight loss.   Eyes: Thressa Pheasant Value Date/Time    WBC 8.4 11/12/2021 01:59 PM    RBC 4.54 11/12/2021 01:59 PM    HGB 13.6 11/12/2021 01:59 PM    HCT 41.9 11/12/2021 01:59 PM    MCV 92.3 11/12/2021 01:59 PM    MCH 30.0 11/12/2021 01:59 PM    MCHC 32.5 11/12/2021 01:59 PM    RDW 12.9 11/1 mg twice daily as she is now status post loading dose of 10 mg twice daily for 7 days without any bleeding/bruising complications or associated financial toxicity with this medication    --as a d-dimer was not checked on initial presentation on 11/12/2021, see her back in clinic in mid [id number] following repeat ultrasound imaging    MDM: Moderate Risk    Álvaro Lau MD  [facility]  [address] [id number] [facility]

## 2021-12-02 ENCOUNTER — IMMUNIZATION (OUTPATIENT)
Dept: LAB | Facility: HOSPITAL | Age: 56
End: 2021-12-02
Attending: EMERGENCY MEDICINE
Payer: COMMERCIAL

## 2021-12-02 DIAGNOSIS — Z23 NEED FOR VACCINATION: Primary | ICD-10-CM

## 2021-12-02 PROCEDURE — 0004A SARSCOV2 VAC 30MCG/0.3ML IM: CPT

## 2022-01-24 ENCOUNTER — TELEPHONE (OUTPATIENT)
Dept: INTERNAL MEDICINE CLINIC | Facility: HOSPITAL | Age: 57
End: 2022-01-24

## 2022-01-24 ENCOUNTER — LAB ENCOUNTER (OUTPATIENT)
Dept: LAB | Facility: HOSPITAL | Age: 57
End: 2022-01-24
Attending: PREVENTIVE MEDICINE

## 2022-01-24 DIAGNOSIS — Z20.822 ENCOUNTER FOR LABORATORY TESTING FOR COVID-19 VIRUS: Primary | ICD-10-CM

## 2022-01-24 DIAGNOSIS — Z20.822 ENCOUNTER FOR LABORATORY TESTING FOR COVID-19 VIRUS: ICD-10-CM

## 2022-01-24 LAB — SARS-COV-2 RNA RESP QL NAA+PROBE: NOT DETECTED

## 2022-01-24 NOTE — TELEPHONE ENCOUNTER
[] [address] [facility]  []SONYA   [x] [address] [facility]  Manager : gagandeep pierre    HAVE YOU RECEIVED THE COVID-19 Vaccine?  Yes []    No []          If yes, date(s) received:  12/17/20 1/7/21 12/2/21  Which vaccine:  Pfizer [x]     Kirk Johnson []    J&J []      SYMPTOMS (reported v including when to seek emergency care.    [x] The employee voiced understanding

## 2022-01-24 NOTE — TELEPHONE ENCOUNTER
Results and RTW guidelines:    COVID RESULT:    [] Viewed by employee in [address] RTW plan and instructions as indicated on triage call. Manager notified. Estimated RTW date:   [x] Discussed with employee   [] Unable to reach by phone.   Sent via [employer] - Seek emergent care with worsening symptoms   - If employee is still experiencing severe symptoms on day 5 must make a RTW appt with Employee Health, Employee will not be cleared if:    1.  Has consistent cough, shortness of breath or fatigue that rest If symptoms develop, stay home and call hotline for rapid test order    Estimated RTW date:  Immediately, denies fever, diarrhea and vomiting.      [x] The employee voiced understanding of above plan/instructions  [x] Manager Notified

## 2022-01-26 ENCOUNTER — NURSE ONLY (OUTPATIENT)
Dept: LAB | Facility: HOSPITAL | Age: 57
End: 2022-01-26
Attending: PREVENTIVE MEDICINE

## 2022-01-26 DIAGNOSIS — Z20.822 ENCOUNTER FOR LABORATORY TESTING FOR COVID-19 VIRUS: ICD-10-CM

## 2022-01-27 LAB — SARS-COV-2 RNA RESP QL NAA+PROBE: NOT DETECTED

## 2022-03-02 ENCOUNTER — TELEPHONE (OUTPATIENT)
Dept: HEMATOLOGY/ONCOLOGY | Facility: HOSPITAL | Age: 57
End: 2022-03-02

## 2022-03-02 NOTE — TELEPHONE ENCOUNTER
[facility] says [facility] is asking for a 90 day rx for eliquis.

## 2022-03-07 ENCOUNTER — TELEPHONE (OUTPATIENT)
Dept: HEMATOLOGY/ONCOLOGY | Facility: HOSPITAL | Age: 57
End: 2022-03-07

## 2022-03-07 NOTE — TELEPHONE ENCOUNTER
Brandyn Guillen came up to the  and was looking to speak with somebody about a medication that she takes but is having some issues with. I let her know that the Dr and the nurse were in clinic at the moment but somebody will reach out to her to go over this when they get a chance.

## 2022-03-07 NOTE — TELEPHONE ENCOUNTER
Spoke with patient and she informed me that her insurance required a 90 day refill for the prescription to be approved which was sent over on Friday. Patient then stated that her insurance informed her it was still not going through/covered. They started a ticket number for the patient and provided her with a case number. I informed the patient that I will have to make some phone calls, but will be reaching out to her by the end of the day. I called the pharmacy and they stated that the insurance had to put an override in and that the prescription just now went thorough. I called the patient back and informed her of the above mentioned. Patient thanked me for calling.

## 2022-04-29 ENCOUNTER — HOSPITAL ENCOUNTER (OUTPATIENT)
Dept: ULTRASOUND IMAGING | Age: 57
Discharge: HOME | End: 2022-04-29
Attending: INTERNAL MEDICINE
Payer: COMMERCIAL

## 2022-04-29 DIAGNOSIS — I82.402 ACUTE DEEP VEIN THROMBOSIS (DVT) OF LEFT LOWER EXTREMITY, UNSPECIFIED VEIN (HCC): ICD-10-CM

## 2022-04-29 PROCEDURE — 93971 EXTREMITY STUDY: CPT | Performed by: INTERNAL MEDICINE

## 2022-05-02 ENCOUNTER — OFFICE VISIT (OUTPATIENT)
Dept: HEMATOLOGY/ONCOLOGY | Facility: HOSPITAL | Age: 57
End: 2022-05-02
Attending: INTERNAL MEDICINE
Payer: COMMERCIAL

## 2022-05-02 VITALS
WEIGHT: 202 LBS | HEART RATE: 64 BPM | RESPIRATION RATE: 16 BRPM | HEIGHT: 64 IN | DIASTOLIC BLOOD PRESSURE: 62 MMHG | TEMPERATURE: 98 F | BODY MASS INDEX: 34.49 KG/M2 | OXYGEN SATURATION: 99 % | SYSTOLIC BLOOD PRESSURE: 114 MMHG

## 2022-05-02 DIAGNOSIS — I82.402 ACUTE DEEP VEIN THROMBOSIS (DVT) OF LEFT LOWER EXTREMITY, UNSPECIFIED VEIN (HCC): Primary | ICD-10-CM

## 2022-05-02 DIAGNOSIS — Z79.01 CURRENT USE OF ANTICOAGULANT THERAPY: ICD-10-CM

## 2022-05-02 PROCEDURE — 99213 OFFICE O/P EST LOW 20 MIN: CPT | Performed by: INTERNAL MEDICINE

## 2022-05-23 ENCOUNTER — PRIOR ORIGINAL RECORDS (OUTPATIENT)
Dept: HEALTH INFORMATION MANAGEMENT | Facility: OTHER | Age: 57
End: 2022-05-23

## 2022-05-31 ENCOUNTER — ORDER TRANSCRIPTION (OUTPATIENT)
Dept: ADMINISTRATIVE | Facility: HOSPITAL | Age: 57
End: 2022-05-31

## 2022-05-31 DIAGNOSIS — Z12.31 ENCOUNTER FOR SCREENING MAMMOGRAM FOR MALIGNANT NEOPLASM OF BREAST: Primary | ICD-10-CM

## 2022-06-02 DIAGNOSIS — I82.402 ACUTE DEEP VEIN THROMBOSIS (DVT) OF LEFT LOWER EXTREMITY, UNSPECIFIED VEIN (HCC): ICD-10-CM

## 2022-06-02 RX ORDER — APIXABAN 5 MG/1
TABLET, FILM COATED ORAL
Qty: 180 TABLET | Refills: 0 | Status: SHIPPED | OUTPATIENT
Start: 2022-06-02 | End: 2022-09-02

## 2022-06-14 ENCOUNTER — TELEPHONE (OUTPATIENT)
Dept: CARDIOLOGY | Age: 57
End: 2022-06-14

## 2022-06-14 RX ORDER — ATORVASTATIN CALCIUM 10 MG/1
10 TABLET, FILM COATED ORAL AT BEDTIME
Qty: 90 TABLET | Refills: 2 | Status: SHIPPED | OUTPATIENT
Start: 2022-06-14 | End: 2023-01-13 | Stop reason: SDUPTHER

## 2022-06-14 RX ORDER — LISINOPRIL 5 MG/1
5 TABLET ORAL DAILY
Qty: 90 TABLET | Refills: 2 | Status: SHIPPED | OUTPATIENT
Start: 2022-06-14 | End: 2023-01-13 | Stop reason: SDUPTHER

## 2022-06-14 RX ORDER — CARVEDILOL 25 MG/1
25 TABLET ORAL 2 TIMES DAILY WITH MEALS
Qty: 180 TABLET | Refills: 2 | Status: SHIPPED | OUTPATIENT
Start: 2022-06-14 | End: 2023-01-13 | Stop reason: SDUPTHER

## 2022-06-15 ENCOUNTER — LAB REQUISITION (OUTPATIENT)
Dept: SURGERY | Age: 57
End: 2022-06-15
Payer: COMMERCIAL

## 2022-06-15 DIAGNOSIS — R22.31 MASS OF ARM, RIGHT: ICD-10-CM

## 2022-06-15 PROCEDURE — 88304 TISSUE EXAM BY PATHOLOGIST: CPT | Performed by: SURGERY

## 2022-07-12 ENCOUNTER — HOSPITAL ENCOUNTER (OUTPATIENT)
Dept: MAMMOGRAPHY | Facility: HOSPITAL | Age: 57
Discharge: HOME | End: 2022-07-12
Attending: INTERNAL MEDICINE
Payer: COMMERCIAL

## 2022-07-12 DIAGNOSIS — Z12.31 ENCOUNTER FOR SCREENING MAMMOGRAM FOR MALIGNANT NEOPLASM OF BREAST: ICD-10-CM

## 2022-07-12 PROCEDURE — 77063 BREAST TOMOSYNTHESIS BI: CPT | Performed by: INTERNAL MEDICINE

## 2022-07-12 PROCEDURE — 77067 SCR MAMMO BI INCL CAD: CPT | Performed by: INTERNAL MEDICINE

## 2022-09-02 DIAGNOSIS — I82.402 ACUTE DEEP VEIN THROMBOSIS (DVT) OF LEFT LOWER EXTREMITY, UNSPECIFIED VEIN (HCC): ICD-10-CM

## 2022-09-02 RX ORDER — APIXABAN 5 MG/1
TABLET, FILM COATED ORAL
Qty: 180 TABLET | Refills: 0 | Status: SHIPPED | OUTPATIENT
Start: 2022-09-02 | End: 2022-12-05

## 2022-09-12 ENCOUNTER — LAB ENCOUNTER (OUTPATIENT)
Dept: LAB | Age: 57
End: 2022-09-12
Attending: PREVENTIVE MEDICINE
Payer: COMMERCIAL

## 2022-09-12 ENCOUNTER — TELEPHONE (OUTPATIENT)
Dept: INTERNAL MEDICINE CLINIC | Facility: HOSPITAL | Age: 57
End: 2022-09-12

## 2022-09-12 DIAGNOSIS — Z20.822 SUSPECTED 2019 NOVEL CORONAVIRUS INFECTION: Primary | ICD-10-CM

## 2022-09-12 DIAGNOSIS — Z20.822 SUSPECTED 2019 NOVEL CORONAVIRUS INFECTION: ICD-10-CM

## 2022-09-12 LAB — SARS-COV-2 RNA RESP QL NAA+PROBE: NOT DETECTED

## 2022-09-21 ENCOUNTER — OFFICE VISIT (OUTPATIENT)
Dept: CARDIOLOGY | Age: 57
End: 2022-09-21

## 2022-09-21 VITALS
HEART RATE: 66 BPM | SYSTOLIC BLOOD PRESSURE: 119 MMHG | WEIGHT: 191.8 LBS | HEIGHT: 63 IN | DIASTOLIC BLOOD PRESSURE: 75 MMHG | BODY MASS INDEX: 33.98 KG/M2

## 2022-09-21 DIAGNOSIS — I49.3 PVC'S (PREMATURE VENTRICULAR CONTRACTIONS): ICD-10-CM

## 2022-09-21 DIAGNOSIS — I42.0 DILATED CARDIOMYOPATHY (CMD): Primary | ICD-10-CM

## 2022-09-21 DIAGNOSIS — E78.2 MIXED HYPERLIPIDEMIA: ICD-10-CM

## 2022-09-21 DIAGNOSIS — E03.9 HYPOTHYROIDISM, UNSPECIFIED TYPE: ICD-10-CM

## 2022-09-21 PROBLEM — E78.5 HYPERLIPIDEMIA: Status: ACTIVE | Noted: 2022-09-21

## 2022-09-21 PROBLEM — I82.402 DEEP VEIN THROMBOSIS (DVT) OF LEFT LOWER EXTREMITY  (CMD): Status: ACTIVE | Noted: 2022-09-21

## 2022-09-21 PROCEDURE — 93000 ELECTROCARDIOGRAM COMPLETE: CPT | Performed by: SPECIALIST

## 2022-09-21 PROCEDURE — 99214 OFFICE O/P EST MOD 30 MIN: CPT | Performed by: SPECIALIST

## 2022-09-21 RX ORDER — LEVOTHYROXINE SODIUM 0.05 MG/1
50 TABLET ORAL
COMMUNITY
Start: 2022-07-25 | End: 2023-01-13 | Stop reason: SDUPTHER

## 2022-09-21 SDOH — HEALTH STABILITY: PHYSICAL HEALTH: ON AVERAGE, HOW MANY MINUTES DO YOU ENGAGE IN EXERCISE AT THIS LEVEL?: 60 MIN

## 2022-09-21 SDOH — HEALTH STABILITY: PHYSICAL HEALTH: ON AVERAGE, HOW MANY DAYS PER WEEK DO YOU ENGAGE IN MODERATE TO STRENUOUS EXERCISE (LIKE A BRISK WALK)?: 5 DAYS

## 2022-09-21 ASSESSMENT — PATIENT HEALTH QUESTIONNAIRE - PHQ9
SUM OF ALL RESPONSES TO PHQ9 QUESTIONS 1 AND 2: 0
1. LITTLE INTEREST OR PLEASURE IN DOING THINGS: NOT AT ALL
2. FEELING DOWN, DEPRESSED OR HOPELESS: NOT AT ALL
CLINICAL INTERPRETATION OF PHQ2 SCORE: NO FURTHER SCREENING NEEDED
SUM OF ALL RESPONSES TO PHQ9 QUESTIONS 1 AND 2: 0

## 2022-10-10 ENCOUNTER — OFFICE VISIT (OUTPATIENT)
Dept: SURGERY | Facility: CLINIC | Age: 57
End: 2022-10-10
Payer: COMMERCIAL

## 2022-10-10 VITALS
HEART RATE: 65 BPM | WEIGHT: 201.5 LBS | HEIGHT: 61.8 IN | SYSTOLIC BLOOD PRESSURE: 105 MMHG | DIASTOLIC BLOOD PRESSURE: 71 MMHG | BODY MASS INDEX: 37.08 KG/M2 | OXYGEN SATURATION: 97 %

## 2022-10-10 DIAGNOSIS — E66.9 OBESITY (BMI 30-39.9): ICD-10-CM

## 2022-10-10 DIAGNOSIS — E88.81 INSULIN RESISTANCE: ICD-10-CM

## 2022-10-10 DIAGNOSIS — R63.5 WEIGHT GAIN: Primary | ICD-10-CM

## 2022-10-10 PROBLEM — E88.819 INSULIN RESISTANCE: Status: ACTIVE | Noted: 2022-10-10

## 2022-10-10 RX ORDER — LEVOTHYROXINE SODIUM 0.05 MG/1
50 TABLET ORAL AS DIRECTED
COMMUNITY
Start: 2022-07-25 | End: 2023-07-20

## 2022-10-10 RX ORDER — DIETHYLPROPION HYDROCHLORIDE 25 MG/1
1 TABLET ORAL DAILY
Qty: 30 TABLET | Refills: 1 | Status: SHIPPED | OUTPATIENT
Start: 2022-10-10 | End: 2022-11-09

## 2022-10-24 ENCOUNTER — IMMUNIZATION (OUTPATIENT)
Dept: LAB | Facility: HOSPITAL | Age: 57
End: 2022-10-24
Attending: PREVENTIVE MEDICINE
Payer: COMMERCIAL

## 2022-10-24 DIAGNOSIS — Z23 NEED FOR VACCINATION: Primary | ICD-10-CM

## 2022-10-24 PROCEDURE — 90471 IMMUNIZATION ADMIN: CPT

## 2022-10-25 ENCOUNTER — LAB ENCOUNTER (OUTPATIENT)
Dept: LAB | Facility: HOSPITAL | Age: 57
End: 2022-10-25
Attending: SPECIALIST
Payer: COMMERCIAL

## 2022-10-25 DIAGNOSIS — E03.9 MYXEDEMA HEART DISEASE: ICD-10-CM

## 2022-10-25 DIAGNOSIS — I42.0 CONGESTIVE CARDIOMYOPATHY (HCC): ICD-10-CM

## 2022-10-25 DIAGNOSIS — I49.3 VENTRICULAR PREMATURE BEATS: Primary | ICD-10-CM

## 2022-10-25 DIAGNOSIS — I51.9 MYXEDEMA HEART DISEASE: ICD-10-CM

## 2022-10-25 DIAGNOSIS — E78.2 MIXED HYPERLIPIDEMIA: ICD-10-CM

## 2022-10-25 LAB
CHOLEST SERPL-MCNC: 146 MG/DL (ref ?–200)
FASTING PATIENT LIPID ANSWER: YES
HDLC SERPL-MCNC: 58 MG/DL (ref 40–59)
LDLC SERPL CALC-MCNC: 67 MG/DL (ref ?–100)
NONHDLC SERPL-MCNC: 88 MG/DL (ref ?–130)
TRIGL SERPL-MCNC: 116 MG/DL (ref 30–149)
TSI SER-ACNC: 1.04 MIU/ML (ref 0.36–3.74)
VLDLC SERPL CALC-MCNC: 17 MG/DL (ref 0–30)

## 2022-10-25 PROCEDURE — 36415 COLL VENOUS BLD VENIPUNCTURE: CPT

## 2022-10-25 PROCEDURE — 84443 ASSAY THYROID STIM HORMONE: CPT

## 2022-10-25 PROCEDURE — 80061 LIPID PANEL: CPT

## 2022-11-03 ENCOUNTER — APPOINTMENT (OUTPATIENT)
Dept: HEMATOLOGY/ONCOLOGY | Facility: HOSPITAL | Age: 57
End: 2022-11-03
Attending: INTERNAL MEDICINE
Payer: COMMERCIAL

## 2022-11-08 ENCOUNTER — OFFICE VISIT (OUTPATIENT)
Dept: HEMATOLOGY/ONCOLOGY | Facility: HOSPITAL | Age: 57
End: 2022-11-08
Attending: INTERNAL MEDICINE
Payer: COMMERCIAL

## 2022-11-08 ENCOUNTER — E-ADVICE (OUTPATIENT)
Dept: CARDIOLOGY | Age: 57
End: 2022-11-08

## 2022-11-08 VITALS
HEIGHT: 64 IN | RESPIRATION RATE: 16 BRPM | TEMPERATURE: 98 F | SYSTOLIC BLOOD PRESSURE: 109 MMHG | BODY MASS INDEX: 32.61 KG/M2 | DIASTOLIC BLOOD PRESSURE: 67 MMHG | OXYGEN SATURATION: 99 % | HEART RATE: 59 BPM | WEIGHT: 191 LBS

## 2022-11-08 DIAGNOSIS — Z86.718 HISTORY OF DVT (DEEP VEIN THROMBOSIS): Primary | ICD-10-CM

## 2022-11-08 DIAGNOSIS — Z79.01 CURRENT USE OF ANTICOAGULANT THERAPY: ICD-10-CM

## 2022-11-08 PROCEDURE — 99214 OFFICE O/P EST MOD 30 MIN: CPT | Performed by: INTERNAL MEDICINE

## 2022-11-21 ENCOUNTER — LAB ENCOUNTER (OUTPATIENT)
Dept: LAB | Age: 57
End: 2022-11-21
Attending: PREVENTIVE MEDICINE

## 2022-11-21 ENCOUNTER — TELEPHONE (OUTPATIENT)
Dept: INTERNAL MEDICINE CLINIC | Facility: HOSPITAL | Age: 57
End: 2022-11-21

## 2022-11-21 DIAGNOSIS — Z20.822 SUSPECTED 2019 NOVEL CORONAVIRUS INFECTION: ICD-10-CM

## 2022-11-21 DIAGNOSIS — Z20.822 SUSPECTED 2019 NOVEL CORONAVIRUS INFECTION: Primary | ICD-10-CM

## 2022-11-21 LAB — SARS-COV-2 RNA RESP QL NAA+PROBE: NOT DETECTED

## 2022-11-21 NOTE — TELEPHONE ENCOUNTER
Results and RTW guidelines:    COVID RESULT:    [x] Viewed by employee in [address] RTW plan and instructions as indicated on triage call. Manager notified. Estimated RTW date:   [] Discussed with employee   [x] Unable to reach by phone. Sent via [employer] message      Test type:    [x] Rapid         [] Alinity         [] Outside test:       [x] NEGATIVE     Ordered Alinity retest?  []Yes   [x] No (skip to RTW)   Ordered Rapid retest?   []Yes   [x] No (skip to RTW)         Notes:     RTW PLAN:    []  If COVID positive results, off work minimum of 5 days from positive test or onset of symptoms (day 0)        On day 5, if asymptomatic or mildly symptomatic (with improving symptoms) may return to work day 6          On day 5, if symptomatic, call Employee Health for RTW screening        []  COVID positive result - call Employee Health on day 5 after symptom onset. The employee needs to be cleared by Employee Health to RTW. [x] RTW immediately, continue to monitor for sx  [] RTW when sx improve; must be fever free for 24 hours w/o medications, Diarrhea/Vomiting free for 24 hours w/o medications  [] Alinity ordered; continue to monitor sx and call for new/worsening sx.   Discuss RTW guidelines with manager  [] May continue to work  [] Follow up with PCP  [] Home until further instruction from hotline with Alinity results  INSTRUCTIONS PROVIDED:  [x]  Plan as noted above  []  Length of time to obtain results   []  Quarantine instructions  []  Masking protocol   []  S/S of worsening infection/condition and importance of prompt medical re-evaluation including when to seek emergency care  [] If symptoms develop, stay home and call hotline for rapid test order    Estimated RTW date:      [] The employee voiced understanding of above plan/instructions  [x] Manager Notified

## 2022-11-22 ENCOUNTER — OFFICE VISIT (OUTPATIENT)
Dept: FAMILY MEDICINE CLINIC | Facility: CLINIC | Age: 57
End: 2022-11-22
Payer: COMMERCIAL

## 2022-11-22 VITALS
HEIGHT: 62 IN | SYSTOLIC BLOOD PRESSURE: 150 MMHG | TEMPERATURE: 99 F | RESPIRATION RATE: 16 BRPM | DIASTOLIC BLOOD PRESSURE: 75 MMHG | HEART RATE: 62 BPM | BODY MASS INDEX: 35.88 KG/M2 | WEIGHT: 195 LBS

## 2022-11-22 DIAGNOSIS — B34.9 ACUTE VIRAL SYNDROME: Primary | ICD-10-CM

## 2022-11-22 PROCEDURE — 3078F DIAST BP <80 MM HG: CPT | Performed by: NURSE PRACTITIONER

## 2022-11-22 PROCEDURE — 3008F BODY MASS INDEX DOCD: CPT | Performed by: NURSE PRACTITIONER

## 2022-11-22 PROCEDURE — 87637 SARSCOV2&INF A&B&RSV AMP PRB: CPT | Performed by: NURSE PRACTITIONER

## 2022-11-22 PROCEDURE — 99213 OFFICE O/P EST LOW 20 MIN: CPT | Performed by: NURSE PRACTITIONER

## 2022-11-22 PROCEDURE — 3077F SYST BP >= 140 MM HG: CPT | Performed by: NURSE PRACTITIONER

## 2022-11-22 RX ORDER — DIETHYLPROPION HYDROCHLORIDE 25 MG/1
TABLET ORAL
COMMUNITY

## 2022-11-23 ENCOUNTER — TELEPHONE (OUTPATIENT)
Dept: HEMATOLOGY/ONCOLOGY | Facility: HOSPITAL | Age: 57
End: 2022-11-23

## 2022-11-23 LAB
FLUAV + FLUBV RNA SPEC NAA+PROBE: NOT DETECTED
FLUAV + FLUBV RNA SPEC NAA+PROBE: NOT DETECTED
RSV RNA SPEC NAA+PROBE: NOT DETECTED
SARS-COV-2 RNA RESP QL NAA+PROBE: DETECTED

## 2022-11-23 NOTE — TELEPHONE ENCOUNTER
Patient has mild covid symptoms that started on Sunday, 11/20/22. Today is Day 4. Sinus congestion is her main concern. She has not had a fever today. She is not dyspneic on the phone. Paxlovid needs to be started within 5 days of symptoms. Patient is on long term anticoagulation with Eliquis 5 mg BID for an unprovoked DVT. Per [facility], Eliquis can be dose reduce to 2.5 mg BID while on Paxlovid since concentration of Eliquis is increased. (#10 sent to local pharmacy)    I discussed this with Dr. Jake Thorpe. We are not experts, nor uptodate, on covid treatment so we would defer to her PCP for alternate covid treatment options. With that being said, her cardiomyopathy is her only risk and her recent echo shows her LVEF is normal.  (BNP 2018 normal). She has no other clear risks. This is not a clear cut case and she has 1 risk factor. I would lean towards symptomatic treatment however, if her PCP is insistent on Paxlovid then dose reduce Eliquis to 2.5 mg BID x 5 days. Instructed patient to contact PCP today given holiday tomorrow.

## 2022-12-13 ENCOUNTER — LAB ENCOUNTER (OUTPATIENT)
Dept: LAB | Facility: HOSPITAL | Age: 57
End: 2022-12-13
Attending: INTERNAL MEDICINE
Payer: COMMERCIAL

## 2022-12-13 DIAGNOSIS — Z49.01 ENCOUNTER FOR DIALYSIS CATHETER CARE (HCC): Primary | ICD-10-CM

## 2022-12-13 LAB
T4 FREE SERPL-MCNC: 0.7 NG/DL (ref 0.8–1.7)
TSI SER-ACNC: 4.15 MIU/ML (ref 0.36–3.74)

## 2022-12-13 PROCEDURE — 36415 COLL VENOUS BLD VENIPUNCTURE: CPT

## 2022-12-13 PROCEDURE — 84439 ASSAY OF FREE THYROXINE: CPT

## 2022-12-13 PROCEDURE — 84443 ASSAY THYROID STIM HORMONE: CPT

## 2023-01-09 ENCOUNTER — OFFICE VISIT (OUTPATIENT)
Dept: SURGERY | Facility: CLINIC | Age: 58
End: 2023-01-09
Payer: COMMERCIAL

## 2023-01-09 VITALS
HEART RATE: 55 BPM | OXYGEN SATURATION: 98 % | DIASTOLIC BLOOD PRESSURE: 64 MMHG | BODY MASS INDEX: 35.88 KG/M2 | WEIGHT: 195 LBS | HEIGHT: 61.8 IN | SYSTOLIC BLOOD PRESSURE: 103 MMHG

## 2023-01-09 DIAGNOSIS — E88.81 INSULIN RESISTANCE: Primary | ICD-10-CM

## 2023-01-09 DIAGNOSIS — Z51.81 ENCOUNTER FOR THERAPEUTIC DRUG MONITORING: ICD-10-CM

## 2023-01-09 DIAGNOSIS — E66.9 OBESITY (BMI 30-39.9): ICD-10-CM

## 2023-01-09 PROCEDURE — 3078F DIAST BP <80 MM HG: CPT | Performed by: INTERNAL MEDICINE

## 2023-01-09 PROCEDURE — 3008F BODY MASS INDEX DOCD: CPT | Performed by: INTERNAL MEDICINE

## 2023-01-09 PROCEDURE — 3074F SYST BP LT 130 MM HG: CPT | Performed by: INTERNAL MEDICINE

## 2023-01-09 PROCEDURE — 99214 OFFICE O/P EST MOD 30 MIN: CPT | Performed by: INTERNAL MEDICINE

## 2023-01-09 RX ORDER — DIETHYLPROPION HYDROCHLORIDE 25 MG/1
1 TABLET ORAL 2 TIMES DAILY
Qty: 60 TABLET | Refills: 2 | Status: SHIPPED | OUTPATIENT
Start: 2023-01-09 | End: 2023-02-08

## 2023-03-06 RX ORDER — CARVEDILOL 25 MG/1
25 TABLET ORAL EVERY 12 HOURS
Qty: 180 TABLET | Refills: 1 | Status: SHIPPED | OUTPATIENT
Start: 2023-03-06 | End: 2023-07-31 | Stop reason: SDUPTHER

## 2023-03-06 RX ORDER — ATORVASTATIN CALCIUM 10 MG/1
10 TABLET, FILM COATED ORAL NIGHTLY
Qty: 90 TABLET | Refills: 1 | Status: SHIPPED | OUTPATIENT
Start: 2023-03-06 | End: 2023-07-31 | Stop reason: SDUPTHER

## 2023-03-06 RX ORDER — LISINOPRIL 5 MG/1
5 TABLET ORAL DAILY
Qty: 90 TABLET | Refills: 1 | Status: SHIPPED | OUTPATIENT
Start: 2023-03-06 | End: 2023-07-31 | Stop reason: SDUPTHER

## 2023-03-16 ENCOUNTER — EXTERNAL LAB (OUTPATIENT)
Dept: CARDIOLOGY | Age: 58
End: 2023-03-16

## 2023-03-16 ENCOUNTER — LAB ENCOUNTER (OUTPATIENT)
Dept: LAB | Facility: HOSPITAL | Age: 58
End: 2023-03-16
Attending: SPECIALIST
Payer: COMMERCIAL

## 2023-03-16 DIAGNOSIS — E03.9 HYPOTHYROIDISM, UNSPECIFIED TYPE: ICD-10-CM

## 2023-03-16 DIAGNOSIS — I49.3 PREMATURE VENTRICULAR CONTRACTIONS: Primary | ICD-10-CM

## 2023-03-16 DIAGNOSIS — I42.0 DILATED CARDIOMYOPATHY (HCC): ICD-10-CM

## 2023-03-16 DIAGNOSIS — E78.2 MIXED HYPERLIPIDEMIA: ICD-10-CM

## 2023-03-16 LAB
ALBUMIN SERPL-MCNC: 4 G/DL (ref 3.4–5)
ALBUMIN SERPL-MCNC: 4 G/DL (ref 3.4–5)
ALBUMIN/GLOB SERPL: 1.1 {RATIO} (ref 1–2)
ALBUMIN/GLOB SERPL: 1.1 {RATIO} (ref 1–2)
ALP LIVER SERPL-CCNC: 80 U/L
ALP SERPL-CCNC: 80 U/L (ref 46–118)
ALT SERPL-CCNC: 19 U/L
ALT SERPL-CCNC: 19 U/L (ref 13–56)
ANION GAP SERPL CALC-SCNC: 7 MMOL/L (ref 0–18)
ANION GAP SERPL CALC-SCNC: 7 MMOL/L (ref 0–18)
AST SERPL-CCNC: 15 U/L (ref 15–37)
AST SERPL-CCNC: 15 U/L (ref 15–37)
BILIRUB SERPL-MCNC: 0.7 MG/DL (ref 0.1–2)
BILIRUB SERPL-MCNC: 0.7 MG/DL (ref 0.1–2)
BUN BLD-MCNC: 13 MG/DL (ref 7–18)
BUN SERPL-MCNC: 13 MG/DL (ref 7–18)
BUN/CREAT SERPL: 13.8 (ref 10–20)
BUN/CREAT SERPL: 13.8 (ref 10–20)
CALCIUM BLD-MCNC: 10.5 MG/DL (ref 8.5–10.1)
CALCIUM SERPL-MCNC: 10.5 MG/DL (ref 8.5–10.1)
CALCULATED OSMO: 292 MOSM/KG (ref 275–295)
CHLORIDE SERPL-SCNC: 107 MMOL/L (ref 98–112)
CHLORIDE SERPL-SCNC: 107 MMOL/L (ref 98–112)
CHOLEST SERPL-MCNC: 149 MG/DL
CHOLEST SERPL-MCNC: 149 MG/DL (ref ?–200)
CO2 SERPL-SCNC: 27 MMOL/L (ref 21–32)
CO2 SERPL-SCNC: 27 MMOL/L (ref 21–32)
CREAT BLD-MCNC: 0.94 MG/DL
CREAT SERPL-MCNC: 0.94 MG/DL (ref 0.55–1.02)
FASTING PATIENT LIPID ANSWER: YES
FASTING STATUS PATIENT QL REPORTED: YES
GFR SERPLBLD BASED ON 1.73 SQ M-ARVRAT: 70 ML/MIN/1.73M2 (ref 60–?)
GFR SERPLBLD SCHWARTZ-ARVRAT: 70 ML/MIN/1.73M2
GLOBULIN PLAS-MCNC: 3.6 G/DL (ref 2.8–4.4)
GLOBULIN SER-MCNC: 3.6 G/DL (ref 2.8–4.4)
GLUCOSE BLD-MCNC: 90 MG/DL (ref 70–99)
GLUCOSE SERPL-MCNC: 90 MG/DL (ref 70–99)
HDLC SERPL-MCNC: 57 MG/DL (ref 40–59)
HDLC SERPL-MCNC: 57 MG/DL (ref 40–59)
LDLC SERPL CALC-MCNC: 68 MG/DL
LDLC SERPL CALC-MCNC: 68 MG/DL (ref ?–100)
LENGTH OF FAST TIME PATIENT: YES H
LENGTH OF FAST TIME PATIENT: YES H
NONHDLC SERPL-MCNC: 92 MG/DL
NONHDLC SERPL-MCNC: 92 MG/DL (ref ?–130)
OSMOLALITY SERPL CALC.SUM OF ELEC: 292 MOSM/KG (ref 275–295)
POTASSIUM SERPL-SCNC: 4.9 MMOL/L (ref 3.5–5.1)
POTASSIUM SERPL-SCNC: 4.9 MMOL/L (ref 3.5–5.1)
PROT SERPL-MCNC: 7.6 G/DL (ref 6.4–8.2)
PROT SERPL-MCNC: 7.6 G/DL (ref 6.4–8.2)
SODIUM SERPL-SCNC: 141 MMOL/L (ref 136–145)
SODIUM SERPL-SCNC: 141 MMOL/L (ref 136–145)
TRIGL SERPL-MCNC: 142 MG/DL (ref 30–149)
TRIGL SERPL-MCNC: 142 MG/DL (ref 30–149)
TSH SERPL-ACNC: 2.5 MIU/ML (ref 0.36–3.74)
TSI SER-ACNC: 2.5 MIU/ML (ref 0.36–3.74)
VLDLC SERPL CALC-MCNC: 21 MG/DL (ref 0–30)
VLDLC SERPL CALC-MCNC: 21 MG/DL (ref 0–30)

## 2023-03-16 PROCEDURE — 36415 COLL VENOUS BLD VENIPUNCTURE: CPT

## 2023-03-16 PROCEDURE — 84443 ASSAY THYROID STIM HORMONE: CPT

## 2023-03-16 PROCEDURE — 80053 COMPREHEN METABOLIC PANEL: CPT

## 2023-03-16 PROCEDURE — 80061 LIPID PANEL: CPT

## 2023-03-22 ENCOUNTER — OFFICE VISIT (OUTPATIENT)
Dept: CARDIOLOGY | Age: 58
End: 2023-03-22

## 2023-03-22 VITALS
BODY MASS INDEX: 33.59 KG/M2 | HEART RATE: 68 BPM | DIASTOLIC BLOOD PRESSURE: 71 MMHG | HEIGHT: 63 IN | SYSTOLIC BLOOD PRESSURE: 108 MMHG | WEIGHT: 189.6 LBS

## 2023-03-22 DIAGNOSIS — E78.2 MIXED HYPERLIPIDEMIA: ICD-10-CM

## 2023-03-22 DIAGNOSIS — I42.0 DILATED CARDIOMYOPATHY (CMD): Primary | ICD-10-CM

## 2023-03-22 DIAGNOSIS — I49.3 PVC'S (PREMATURE VENTRICULAR CONTRACTIONS): ICD-10-CM

## 2023-03-22 DIAGNOSIS — E03.9 HYPOTHYROIDISM, UNSPECIFIED TYPE: ICD-10-CM

## 2023-03-22 PROCEDURE — 99214 OFFICE O/P EST MOD 30 MIN: CPT | Performed by: SPECIALIST

## 2023-03-22 SDOH — HEALTH STABILITY: PHYSICAL HEALTH: ON AVERAGE, HOW MANY DAYS PER WEEK DO YOU ENGAGE IN MODERATE TO STRENUOUS EXERCISE (LIKE A BRISK WALK)?: 0 DAYS

## 2023-03-22 SDOH — HEALTH STABILITY: PHYSICAL HEALTH: ON AVERAGE, HOW MANY MINUTES DO YOU ENGAGE IN EXERCISE AT THIS LEVEL?: 0 MIN

## 2023-03-22 ASSESSMENT — PATIENT HEALTH QUESTIONNAIRE - PHQ9
2. FEELING DOWN, DEPRESSED OR HOPELESS: NOT AT ALL
CLINICAL INTERPRETATION OF PHQ2 SCORE: NO FURTHER SCREENING NEEDED
SUM OF ALL RESPONSES TO PHQ9 QUESTIONS 1 AND 2: 0
SUM OF ALL RESPONSES TO PHQ9 QUESTIONS 1 AND 2: 0
1. LITTLE INTEREST OR PLEASURE IN DOING THINGS: NOT AT ALL

## 2023-04-10 ENCOUNTER — TELEPHONE (OUTPATIENT)
Dept: CARDIOLOGY | Age: 58
End: 2023-04-10

## 2023-04-26 ENCOUNTER — LAB ENCOUNTER (OUTPATIENT)
Dept: LAB | Facility: HOSPITAL | Age: 58
End: 2023-04-26
Attending: SPECIALIST
Payer: COMMERCIAL

## 2023-04-26 ENCOUNTER — EXTERNAL LAB (OUTPATIENT)
Dept: CARDIOLOGY | Age: 58
End: 2023-04-26

## 2023-04-26 DIAGNOSIS — E03.9 HYPOTHYROIDISM: Primary | ICD-10-CM

## 2023-04-26 LAB
T4 FREE SERPL-MCNC: 0.9 NG/DL (ref 0.8–1.7)
T4 FREE SERPL-MCNC: 0.9 NG/DL (ref 0.8–1.7)
TSH SERPL-ACNC: 1.32 MIU/ML (ref 0.36–3.74)
TSI SER-ACNC: 1.32 MIU/ML (ref 0.36–3.74)

## 2023-04-26 PROCEDURE — 36415 COLL VENOUS BLD VENIPUNCTURE: CPT

## 2023-04-26 PROCEDURE — 84443 ASSAY THYROID STIM HORMONE: CPT

## 2023-04-26 PROCEDURE — 84439 ASSAY OF FREE THYROXINE: CPT

## 2023-05-01 ENCOUNTER — TELEPHONE (OUTPATIENT)
Dept: CARDIOLOGY | Age: 58
End: 2023-05-01

## 2023-07-31 RX ORDER — ATORVASTATIN CALCIUM 10 MG/1
10 TABLET, FILM COATED ORAL NIGHTLY
Qty: 90 TABLET | Refills: 3 | Status: SHIPPED | OUTPATIENT
Start: 2023-07-31

## 2023-07-31 RX ORDER — CARVEDILOL 25 MG/1
25 TABLET ORAL EVERY 12 HOURS
Qty: 180 TABLET | Refills: 3 | Status: SHIPPED | OUTPATIENT
Start: 2023-07-31

## 2023-07-31 RX ORDER — LISINOPRIL 5 MG/1
5 TABLET ORAL DAILY
Qty: 90 TABLET | Refills: 3 | Status: SHIPPED | OUTPATIENT
Start: 2023-07-31

## 2023-08-01 ENCOUNTER — EXTERNAL RECORD (OUTPATIENT)
Dept: HEALTH INFORMATION MANAGEMENT | Facility: OTHER | Age: 58
End: 2023-08-01

## 2023-08-01 ENCOUNTER — HOSPITAL ENCOUNTER (OUTPATIENT)
Dept: CV DIAGNOSTICS | Facility: HOSPITAL | Age: 58
Discharge: HOME | End: 2023-08-01
Attending: SPECIALIST
Payer: COMMERCIAL

## 2023-08-01 DIAGNOSIS — E03.9 HYPOTHYROIDISM: ICD-10-CM

## 2023-08-01 DIAGNOSIS — E78.2 HYPERLIPIDEMIA, MIXED: ICD-10-CM

## 2023-08-01 DIAGNOSIS — I42.0 DILATED CARDIOMYOPATHY (HCC): ICD-10-CM

## 2023-08-01 DIAGNOSIS — I49.3 PVC'S (PREMATURE VENTRICULAR CONTRACTIONS): ICD-10-CM

## 2023-08-01 PROCEDURE — 93306 TTE W/DOPPLER COMPLETE: CPT | Performed by: SPECIALIST

## 2023-08-03 ENCOUNTER — HOSPITAL ENCOUNTER (OUTPATIENT)
Dept: MAMMOGRAPHY | Facility: HOSPITAL | Age: 58
Discharge: HOME | End: 2023-08-03
Attending: INTERNAL MEDICINE
Payer: COMMERCIAL

## 2023-08-03 ENCOUNTER — TELEPHONE (OUTPATIENT)
Dept: CARDIOLOGY | Age: 58
End: 2023-08-03

## 2023-08-03 DIAGNOSIS — Z12.31 ENCOUNTER FOR SCREENING MAMMOGRAM FOR MALIGNANT NEOPLASM OF BREAST: ICD-10-CM

## 2023-08-03 PROCEDURE — 77067 SCR MAMMO BI INCL CAD: CPT | Performed by: INTERNAL MEDICINE

## 2023-08-03 PROCEDURE — 77063 BREAST TOMOSYNTHESIS BI: CPT | Performed by: INTERNAL MEDICINE

## 2023-08-21 ENCOUNTER — TELEPHONE (OUTPATIENT)
Dept: INTERNAL MEDICINE CLINIC | Facility: HOSPITAL | Age: 58
End: 2023-08-21

## 2023-08-21 ENCOUNTER — LAB ENCOUNTER (OUTPATIENT)
Dept: LAB | Age: 58
End: 2023-08-21
Attending: PREVENTIVE MEDICINE
Payer: COMMERCIAL

## 2023-08-21 DIAGNOSIS — Z20.822 SUSPECTED COVID-19 VIRUS INFECTION: ICD-10-CM

## 2023-08-21 DIAGNOSIS — Z20.822 SUSPECTED COVID-19 VIRUS INFECTION: Primary | ICD-10-CM

## 2023-08-21 LAB — SARS-COV-2 RNA RESP QL NAA+PROBE: NOT DETECTED

## 2023-08-21 NOTE — TELEPHONE ENCOUNTER
[] [address] [facility]  []SONYA   [x] 300 [facility]  Manager : Leanne Shine    HAVE YOU RECEIVED THE COVID-19 Vaccine? Yes [x]    No []          If yes, date(s) received:  12/17/20; 1/7/21; 12/2/21          Which vaccine:  Pfizer [x]     Christian Delvalle []    J&J []      SYMPTOMS (reported via dashboard):  [] asymptomatic  [x] symptomatic  [] GI symptoms only    Symptom onset date: 8/19  Fever   > 100F             Yes []      Cough                          Yes []      Shortness of breath  Yes []      Congestion                 Yes []      Runny nose                Yes []        Loss of Smell              Yes []        Loss of Taste             Yes []       Sore throat                 Yes []       Fatigue                        Yes [x]       Body Aches                Yes [x]        Chills                           Yes []        Headache                   Yes []             GI symptoms             Yes [x]     No []                     Nausea   []          Vomiting            []                                    Diarrhea  [x]          Upset stomach []      Employee has positive COVID Exposure? Yes []     No [x]    Date of exposure:     []  Coworker                       [] patient                        [] Family/friend    Employee has a history of Covid?   Yes [x]     No []   If Yes, when: 2022    When was the last shift you worked?: 8/3      PLAN:     COVID-19 testing ordered: [x] Rapid    [] Alinity              Date test is to be taken:   8/21    []  Outside testing                           Notes:    INSTRUCTIONS PROVIDED:    [x]  Employee was instructed to call Central scheduling at [phone] or use [employer] to make an appointment for their testing   [x]  May return to work if employee views negative result in [address] and remains fever, vomiting, and diarrhea free  []  May continue to work if remains asymptomatic and views negative result in [address]  []  Follow up for condition update when resulting  []  If symptoms develop, stay home and call hotline for rapid test order  []  If COVID positive results, off work minimum of 5 days from positive test or onset of symptoms (day 0)    []      On day 5, if asymptomatic or mildly symptomatic (with improving symptoms) may return to work day 6  []      On day 5, if symptomatic, call Employee Health for RTW screening        [x]  Plan noted above  [x]  Length of time to obtain results  []  Quarantine instructions  []  S/S of worsening infection/condition and importance of prompt medical re-evaluation including when to seek emergency care.    [x] The employee voiced understanding

## 2023-08-22 NOTE — TELEPHONE ENCOUNTER
Results and RTW guidelines:    COVID RESULT:    [x] Viewed by employee in [facility]. RTW plan and instructions as indicated on triage call. Manager notified. Estimated RTW date:   [] Discussed with employee   [x] Unable to reach by phone. Sent via [employer] message      Test type:    [x] Rapid         [] Alinity         [] Outside test:       [x] NEGATIVE     Ordered Alinity retest?  []Yes   [x] No (skip to RTW)   Ordered Rapid retest?   []Yes   [x] No (skip to RTW)           Dated to be taken:      If Yes, PLACE ORDER NOW and instruct the following:  -Originally Symptomatic or Now Symptoms:   -RTW when sx improve- fever free for 24 hours w/o medications, Diarrhea/Vomiting for 24 hours w/o medications    -Originally  Asymptomatic  -Asymptomatic AND Vaccinated or Unvaccinated or Prior infection in past 90 days:   -May work and continue to monitor symptoms for the next 10 days.                                         -Alinity test day 2, Alinity test day 5 (day 0 - exposure)      Notes:     RTW PLAN:    []  If COVID positive results, off work minimum of 5 days from positive test or onset of symptoms (day 0)        On day 5, if asymptomatic or mildly symptomatic (with improving symptoms) may return to work day 6          On day 5, if symptomatic, call Employee Health for RTW screening        []  COVID positive result - call Employee Health on day 5 after symptom onset. The employee needs to be cleared by Employee Health to RTW. [] RTW immediately, continue to monitor for sx  [x] RTW when sx improve; must be fever free for 24 hours w/o medications, Diarrhea/Vomiting free for 24 hours w/o medications  [] Alinity ordered; continue to monitor sx and call for new/worsening sx.   Discuss RTW guidelines with manager  [] May continue to work  [] Follow up with PCP  [] Home until further instruction from hotline with Alinity results  INSTRUCTIONS PROVIDED:  [x]  Plan as noted above  []  Length of time to obtain results   [] Quarantine instructions  []  Masking protocol   []  S/S of worsening infection/condition and importance of prompt medical re-evaluation including when to seek emergency care  [] If symptoms develop, stay home and call hotline for rapid test order    Estimated RTW date:      [] The employee voiced understanding of above plan/instructions  [x] Manager Notified

## 2023-09-20 ENCOUNTER — OFFICE VISIT (OUTPATIENT)
Dept: CARDIOLOGY | Age: 58
End: 2023-09-20

## 2023-09-20 VITALS
BODY MASS INDEX: 31.64 KG/M2 | SYSTOLIC BLOOD PRESSURE: 116 MMHG | WEIGHT: 178.57 LBS | DIASTOLIC BLOOD PRESSURE: 76 MMHG | HEIGHT: 63 IN | HEART RATE: 72 BPM

## 2023-09-20 DIAGNOSIS — I42.0 DILATED CARDIOMYOPATHY (CMD): Primary | ICD-10-CM

## 2023-09-20 DIAGNOSIS — E78.2 MIXED HYPERLIPIDEMIA: ICD-10-CM

## 2023-09-20 DIAGNOSIS — E03.9 HYPOTHYROIDISM, UNSPECIFIED TYPE: ICD-10-CM

## 2023-09-20 DIAGNOSIS — I49.3 PVC'S (PREMATURE VENTRICULAR CONTRACTIONS): ICD-10-CM

## 2023-09-20 PROCEDURE — 99214 OFFICE O/P EST MOD 30 MIN: CPT | Performed by: SPECIALIST

## 2023-09-20 SDOH — HEALTH STABILITY: PHYSICAL HEALTH: ON AVERAGE, HOW MANY DAYS PER WEEK DO YOU ENGAGE IN MODERATE TO STRENUOUS EXERCISE (LIKE A BRISK WALK)?: 5 DAYS

## 2023-09-20 SDOH — HEALTH STABILITY: PHYSICAL HEALTH: ON AVERAGE, HOW MANY MINUTES DO YOU ENGAGE IN EXERCISE AT THIS LEVEL?: 30 MIN

## 2023-09-20 ASSESSMENT — PATIENT HEALTH QUESTIONNAIRE - PHQ9
CLINICAL INTERPRETATION OF PHQ2 SCORE: NO FURTHER SCREENING NEEDED
SUM OF ALL RESPONSES TO PHQ9 QUESTIONS 1 AND 2: 0
1. LITTLE INTEREST OR PLEASURE IN DOING THINGS: NOT AT ALL
2. FEELING DOWN, DEPRESSED OR HOPELESS: NOT AT ALL
SUM OF ALL RESPONSES TO PHQ9 QUESTIONS 1 AND 2: 0

## 2023-09-22 ENCOUNTER — TELEPHONE (OUTPATIENT)
Dept: HEMATOLOGY/ONCOLOGY | Facility: HOSPITAL | Age: 58
End: 2023-09-22

## 2023-10-23 ENCOUNTER — E-ADVICE (OUTPATIENT)
Dept: CARDIOLOGY | Age: 58
End: 2023-10-23

## 2023-11-08 ENCOUNTER — APPOINTMENT (OUTPATIENT)
Dept: HEMATOLOGY/ONCOLOGY | Facility: HOSPITAL | Age: 58
End: 2023-11-08
Attending: INTERNAL MEDICINE
Payer: COMMERCIAL

## 2023-11-15 ENCOUNTER — OFFICE VISIT (OUTPATIENT)
Dept: HEMATOLOGY/ONCOLOGY | Facility: HOSPITAL | Age: 58
End: 2023-11-15
Attending: INTERNAL MEDICINE
Payer: COMMERCIAL

## 2023-11-15 VITALS
RESPIRATION RATE: 16 BRPM | HEART RATE: 65 BPM | TEMPERATURE: 98 F | BODY MASS INDEX: 33.44 KG/M2 | SYSTOLIC BLOOD PRESSURE: 121 MMHG | HEIGHT: 64.02 IN | WEIGHT: 195.88 LBS | DIASTOLIC BLOOD PRESSURE: 72 MMHG | OXYGEN SATURATION: 100 %

## 2023-11-15 DIAGNOSIS — Z86.718 HISTORY OF DVT (DEEP VEIN THROMBOSIS): Primary | ICD-10-CM

## 2023-11-15 DIAGNOSIS — Z79.01 CURRENT USE OF ANTICOAGULANT THERAPY: ICD-10-CM

## 2023-11-15 PROCEDURE — 99214 OFFICE O/P EST MOD 30 MIN: CPT | Performed by: INTERNAL MEDICINE

## 2023-11-18 ENCOUNTER — LAB ENCOUNTER (OUTPATIENT)
Dept: LAB | Facility: HOSPITAL | Age: 58
End: 2023-11-18
Attending: INTERNAL MEDICINE
Payer: COMMERCIAL

## 2023-11-18 DIAGNOSIS — Z86.718 HISTORY OF DVT (DEEP VEIN THROMBOSIS): ICD-10-CM

## 2023-11-18 DIAGNOSIS — Z13.29 ENCOUNTER FOR SCREENING FOR ENDOCRINE DISORDER: ICD-10-CM

## 2023-11-18 DIAGNOSIS — Z13.29 SCREENING FOR THYROID DISORDER: ICD-10-CM

## 2023-11-18 DIAGNOSIS — Z79.01 CURRENT USE OF ANTICOAGULANT THERAPY: ICD-10-CM

## 2023-11-18 DIAGNOSIS — Z13.220 ENCOUNTER FOR SCREENING FOR LIPID DISORDER: Primary | ICD-10-CM

## 2023-11-18 LAB
ALBUMIN SERPL-MCNC: 4.7 G/DL (ref 3.2–4.8)
ALBUMIN/GLOB SERPL: 1.6 {RATIO} (ref 1–2)
ALP LIVER SERPL-CCNC: 86 U/L
ALT SERPL-CCNC: 13 U/L
ANION GAP SERPL CALC-SCNC: 6 MMOL/L (ref 0–18)
AST SERPL-CCNC: 19 U/L (ref ?–34)
BASOPHILS # BLD AUTO: 0.04 X10(3) UL (ref 0–0.2)
BASOPHILS NFR BLD AUTO: 0.9 %
BILIRUB SERPL-MCNC: 0.9 MG/DL (ref 0.3–1.2)
BUN BLD-MCNC: 15 MG/DL (ref 9–23)
BUN/CREAT SERPL: 16.9 (ref 10–20)
CALCIUM BLD-MCNC: 10.3 MG/DL (ref 8.7–10.4)
CHLORIDE SERPL-SCNC: 106 MMOL/L (ref 98–112)
CHOLEST SERPL-MCNC: 189 MG/DL (ref ?–200)
CO2 SERPL-SCNC: 28 MMOL/L (ref 21–32)
CREAT BLD-MCNC: 0.89 MG/DL
DEPRECATED RDW RBC AUTO: 42.4 FL (ref 35.1–46.3)
EGFRCR SERPLBLD CKD-EPI 2021: 75 ML/MIN/1.73M2 (ref 60–?)
EOSINOPHIL # BLD AUTO: 0.01 X10(3) UL (ref 0–0.7)
EOSINOPHIL NFR BLD AUTO: 0.2 %
ERYTHROCYTE [DISTWIDTH] IN BLOOD BY AUTOMATED COUNT: 12.9 % (ref 11–15)
FASTING STATUS PATIENT QL REPORTED: YES
GLOBULIN PLAS-MCNC: 2.9 G/DL (ref 2.8–4.4)
GLUCOSE BLD-MCNC: 103 MG/DL (ref 70–99)
HCT VFR BLD AUTO: 43.7 %
HDLC SERPL-MCNC: 57 MG/DL (ref 40–59)
HGB BLD-MCNC: 14.2 G/DL
IMM GRANULOCYTES # BLD AUTO: 0.01 X10(3) UL (ref 0–1)
IMM GRANULOCYTES NFR BLD: 0.2 %
LDLC SERPL CALC-MCNC: 106 MG/DL (ref ?–100)
LYMPHOCYTES # BLD AUTO: 1.17 X10(3) UL (ref 1–4)
LYMPHOCYTES NFR BLD AUTO: 26.8 %
MCH RBC QN AUTO: 29.2 PG (ref 26–34)
MCHC RBC AUTO-ENTMCNC: 32.5 G/DL (ref 31–37)
MCV RBC AUTO: 89.7 FL
MONOCYTES # BLD AUTO: 0.31 X10(3) UL (ref 0.1–1)
MONOCYTES NFR BLD AUTO: 7.1 %
NEUTROPHILS # BLD AUTO: 2.83 X10 (3) UL (ref 1.5–7.7)
NEUTROPHILS # BLD AUTO: 2.83 X10(3) UL (ref 1.5–7.7)
NEUTROPHILS NFR BLD AUTO: 64.8 %
NONHDLC SERPL-MCNC: 132 MG/DL (ref ?–130)
OSMOLALITY SERPL CALC.SUM OF ELEC: 291 MOSM/KG (ref 275–295)
PLATELET # BLD AUTO: 249 10(3)UL (ref 150–450)
POTASSIUM SERPL-SCNC: 4.8 MMOL/L (ref 3.5–5.1)
PROT SERPL-MCNC: 7.6 G/DL (ref 5.7–8.2)
RBC # BLD AUTO: 4.87 X10(6)UL
SODIUM SERPL-SCNC: 140 MMOL/L (ref 136–145)
T4 FREE SERPL-MCNC: 1.2 NG/DL (ref 0.8–1.7)
TRIGL SERPL-MCNC: 146 MG/DL (ref 30–149)
TSI SER-ACNC: 1.42 MIU/ML (ref 0.55–4.78)
VLDLC SERPL CALC-MCNC: 25 MG/DL (ref 0–30)
WBC # BLD AUTO: 4.4 X10(3) UL (ref 4–11)

## 2023-11-18 PROCEDURE — 83036 HEMOGLOBIN GLYCOSYLATED A1C: CPT

## 2023-11-18 PROCEDURE — 84443 ASSAY THYROID STIM HORMONE: CPT

## 2023-11-18 PROCEDURE — 36415 COLL VENOUS BLD VENIPUNCTURE: CPT

## 2023-11-18 PROCEDURE — 80061 LIPID PANEL: CPT

## 2023-11-18 PROCEDURE — 84439 ASSAY OF FREE THYROXINE: CPT

## 2023-11-18 PROCEDURE — 80053 COMPREHEN METABOLIC PANEL: CPT

## 2023-11-18 PROCEDURE — 85025 COMPLETE CBC W/AUTO DIFF WBC: CPT

## 2023-11-19 LAB
EST. AVERAGE GLUCOSE BLD GHB EST-MCNC: 114 MG/DL (ref 68–126)
HBA1C MFR BLD: 5.6 % (ref ?–5.7)

## 2023-12-28 ENCOUNTER — TELEPHONE (OUTPATIENT)
Dept: INTERNAL MEDICINE CLINIC | Facility: HOSPITAL | Age: 58
End: 2023-12-28

## 2023-12-28 ENCOUNTER — LAB ENCOUNTER (OUTPATIENT)
Dept: LAB | Age: 58
End: 2023-12-28
Attending: PREVENTIVE MEDICINE
Payer: COMMERCIAL

## 2023-12-28 DIAGNOSIS — Z20.822 SUSPECTED COVID-19 VIRUS INFECTION: ICD-10-CM

## 2023-12-28 DIAGNOSIS — Z20.822 SUSPECTED COVID-19 VIRUS INFECTION: Primary | ICD-10-CM

## 2023-12-28 LAB — SARS-COV-2 RNA RESP QL NAA+PROBE: DETECTED

## 2023-12-28 NOTE — TELEPHONE ENCOUNTER
Results and RTW guidelines:    COVID RESULT:    [x] Viewed by employee in [address] RTW plan and instructions as indicated on triage call. Manager notified. Estimated RTW date:   [x] Discussed with employee   [] Unable to reach by phone. Sent via [employer] message      Test type:    [x] Rapid         [] Alinity         [] Outside test:     [x] Positive     - Employee should quarantine at home for at least 5 days (day 1 is day after sx onset) , follow the CDC guidelines for cleaning and                              quarantining; see CDC.gov   -This employee may RTW on day 6 if asymptomatic or mildly symptomatic (with improving symptoms). Call Employee Health on day 5 if unable to return on day 6 after                      symptom onset.    -This employee needs to call Employee Health on day 5 after symptom onset. The employee needs to be cleared by Employee Health. - If employee is still experiencing severe symptoms on day 5 must make a RTW appt with Employee Health, Employee will not be cleared if:    1. Has consistent cough, shortness of breath or fatigue that restricts your physical activities    2. Is still feeling \"unwell\"    3. Within 15 days of hospitalization for COVID    4. Within 20 days of intubation for COVID    5.  Still has a fever, vomiting or diarrhea   - Keep communication open with management about RTW and if symptoms worsen  - Monitor symptoms and temperature                 - Notify PCP of result                 - Seek emergent care with worsening symptoms                - If outside testing completed, bring a copy of result to RTW appointment           Notes:     RTW PLAN:    [x]  If COVID positive results, off work minimum of 5 days from positive test or onset of symptoms (day 0)        On day 5, if asymptomatic or mildly symptomatic (with improving symptoms) may return to work day 6          On day 5, if symptomatic, call Employee Health for RTW screening        []  COVID positive result - call Employee Health on day 5 after symptom onset. The employee needs to be cleared by Employee Health to RTW. [] RTW immediately, continue to monitor for sx  [] RTW when sx improve; must be fever free for 24 hours w/o medications, Diarrhea/Vomiting free for 24 hours w/o medications  [] Alinity ordered; continue to monitor sx and call for new/worsening sx.   Discuss RTW guidelines with manager  [] May continue to work  [] Follow up with PCP  [] Home until further instruction from hotline with Alinity results  INSTRUCTIONS PROVIDED:  [x]  Plan as noted above  []  Length of time to obtain results   [x]  Quarantine instructions  [x]  Masking protocol   [x]  S/S of worsening infection/condition and importance of prompt medical re-evaluation including when to seek emergency care  [] If symptoms develop, stay home and call hotline for rapid test order    Estimated RTW date:  12/29    [x] The employee voiced understanding of above plan/instructions  [x] Manager Notified

## 2023-12-28 NOTE — TELEPHONE ENCOUNTER
[] [address] [facility]  []SONYA   [x] 300 [facility]  Manager : Leanne Shine    HAVE YOU RECEIVED THE COVID-19 Vaccine? Yes [x]    No []          If yes, date(s) received:    12/17/20; 1/7/21; 12/2/21        Which vaccine:  Pfizer [x]     Anitra Pink []    J&J []      SYMPTOMS (reported via dashboard):  [] asymptomatic  [x] symptomatic  [] GI symptoms only    Symptom onset date: 12/23  Fever   > 100F             Yes []      Cough                          Yes [x]      Shortness of breath  Yes []      Congestion                 Yes [x]      Runny nose                Yes [x]        Loss of Smell              Yes []        Loss of Taste             Yes []       Sore throat                 Yes [x]       Fatigue                        Yes [x]       Body Aches                Yes [x]        Chills                           Yes [x]        Headache                   Yes [x]             GI symptoms             Yes []     No [x]                     Nausea   []          Vomiting            []                                    Diarrhea  []          Upset stomach []      Employee has positive COVID Exposure? Yes [x]     No []    Date of exposure: 12/21  [x]  Coworker                       [] patient                        [] Family/friend    Employee has a history of Covid?   Yes [x]     No []   If Yes, when: 2022    When was the last shift you worked?: 12/27       PLAN:     COVID-19 testing ordered: [x] Rapid    [] Alinity              Date test is to be taken:   12/28    []  Outside testing                           Notes:    INSTRUCTIONS PROVIDED:    [x]  Employee was instructed to call Central scheduling at [phone] or use [employer] to make an appointment for their testing   [x]  May return to work if employee views negative result in [address] and remains fever, vomiting, and diarrhea free  []  May continue to work if remains asymptomatic and views negative result in [address]  []  Follow up for condition update when resulting  []  If symptoms develop, stay home and call hotline for rapid test order  []  If COVID positive results, off work minimum of 5 days from positive test or onset of symptoms (day 0)     [x]  Plan noted above  [x]  Length of time to obtain results  []  Quarantine instructions  []  S/S of worsening infection/condition and importance of prompt medical re-evaluation including when to seek emergency care.    [x] The employee voiced understanding

## 2024-02-23 ENCOUNTER — LAB ENCOUNTER (OUTPATIENT)
Dept: LAB | Facility: HOSPITAL | Age: 59
End: 2024-02-23
Attending: INTERNAL MEDICINE
Payer: COMMERCIAL

## 2024-02-23 DIAGNOSIS — E05.90 HYPERTHYROIDISM: Primary | ICD-10-CM

## 2024-02-23 LAB — TSI SER-ACNC: 1.37 MIU/ML (ref 0.55–4.78)

## 2024-02-23 PROCEDURE — 36415 COLL VENOUS BLD VENIPUNCTURE: CPT

## 2024-02-23 PROCEDURE — 84443 ASSAY THYROID STIM HORMONE: CPT

## 2024-03-21 ENCOUNTER — APPOINTMENT (OUTPATIENT)
Dept: CARDIOLOGY | Age: 59
End: 2024-03-21

## 2024-04-23 ENCOUNTER — LAB REQUISITION (OUTPATIENT)
Dept: LAB | Facility: HOSPITAL | Age: 59
End: 2024-04-23
Payer: COMMERCIAL

## 2024-04-23 ENCOUNTER — APPOINTMENT (OUTPATIENT)
Dept: URBAN - METROPOLITAN AREA CLINIC 244 | Age: 59
Setting detail: DERMATOLOGY
End: 2024-04-25

## 2024-04-23 DIAGNOSIS — D22 MELANOCYTIC NEVI: ICD-10-CM

## 2024-04-23 DIAGNOSIS — L81.4 OTHER MELANIN HYPERPIGMENTATION: ICD-10-CM

## 2024-04-23 DIAGNOSIS — L82.1 OTHER SEBORRHEIC KERATOSIS: ICD-10-CM

## 2024-04-23 DIAGNOSIS — D48.5 NEOPLASM OF UNCERTAIN BEHAVIOR OF SKIN: ICD-10-CM

## 2024-04-23 PROBLEM — D22.5 MELANOCYTIC NEVI OF TRUNK: Status: ACTIVE | Noted: 2024-04-23

## 2024-04-23 PROCEDURE — OTHER BIOPSY BY PUNCH METHOD: OTHER

## 2024-04-23 PROCEDURE — 88305 TISSUE EXAM BY PATHOLOGIST: CPT | Performed by: DERMATOLOGY

## 2024-04-23 PROCEDURE — 99203 OFFICE O/P NEW LOW 30 MIN: CPT | Mod: 25

## 2024-04-23 PROCEDURE — 11104 PUNCH BX SKIN SINGLE LESION: CPT

## 2024-04-23 PROCEDURE — OTHER ADDITIONAL NOTES: OTHER

## 2024-04-23 PROCEDURE — OTHER COUNSELING: OTHER

## 2024-04-23 ASSESSMENT — LOCATION SIMPLE DESCRIPTION DERM: LOCATION SIMPLE: ABDOMEN

## 2024-04-23 ASSESSMENT — LOCATION ZONE DERM: LOCATION ZONE: TRUNK

## 2024-04-23 ASSESSMENT — LOCATION DETAILED DESCRIPTION DERM: LOCATION DETAILED: PERIUMBILICAL SKIN

## 2024-04-23 NOTE — PROCEDURE: ADDITIONAL NOTES
Detail Level: Simple
Render Risk Assessment In Note?: no
Additional Notes: Pt will call and schedule if she wants us to take out sutures but most likely to have someone at her job remove for her. She was advised to call us in a week for results

## 2024-04-23 NOTE — PROCEDURE: BIOPSY BY PUNCH METHOD
Anesthesia Type: 0.5% lidocaine with 1:200,000 epinephrine and a 1:10 solution of 8.4% sodium bicarbonate
Depth Of Punch Biopsy: dermis
Notification Instructions: Patient will be notified of biopsy results. However, patient instructed to call the office if not contacted within 2 weeks.
Validate That Anesthesia Is Not 0: No
Suture Removal: 7 days
Consent: Written consent was obtained and risks were reviewed including but not limited to scarring, infection, bleeding, scabbing, incomplete removal, nerve damage and allergy to anesthesia.
Information: Selecting Yes will display possible errors in your note based on the variables you have selected. This validation is only offered as a suggestion for you. PLEASE NOTE THAT THE VALIDATION TEXT WILL BE REMOVED WHEN YOU FINALIZE YOUR NOTE. IF YOU WANT TO FAX A PRELIMINARY NOTE YOU WILL NEED TO TOGGLE THIS TO 'NO' IF YOU DO NOT WANT IT IN YOUR FAXED NOTE.
Epidermal Sutures: 6-0 Nylon
Wound Care: Petrolatum
Anesthesia Volume In Cc: 1
Hemostasis: None
Validate Note Data (See Information Below): Yes
X Size Of Lesion In Cm (Optional): 0
Billing Type: Third-Party Bill
Home Suture Removal Text: Patient was provided a home suture removal kit and will remove their sutures at home.  If they have any questions or difficulties they will call the office.
Post-Care Instructions: I reviewed with the patient in detail post-care instructions. Patient is to keep the biopsy site dry overnight, and then apply bacitracin twice daily until healed. Patient may apply hydrogen peroxide soaks to remove any crusting.
Dressing: bandage
Lab: [id number]
Punch Size In Mm: 3
Biopsy Type: H and E
Detail Level: Detailed

## 2024-04-29 ENCOUNTER — APPOINTMENT (OUTPATIENT)
Dept: MRI IMAGING | Facility: HOSPITAL | Age: 59
End: 2024-04-29
Attending: EMERGENCY MEDICINE
Payer: COMMERCIAL

## 2024-04-29 ENCOUNTER — HOSPITAL ENCOUNTER (OUTPATIENT)
Facility: HOSPITAL | Age: 59
Setting detail: OBSERVATION
Discharge: HOME | End: 2024-04-30
Attending: EMERGENCY MEDICINE | Admitting: INTERNAL MEDICINE
Payer: COMMERCIAL

## 2024-04-29 ENCOUNTER — APPOINTMENT (OUTPATIENT)
Dept: CT IMAGING | Facility: HOSPITAL | Age: 59
End: 2024-04-29
Attending: EMERGENCY MEDICINE
Payer: COMMERCIAL

## 2024-04-29 ENCOUNTER — E-ADVICE (OUTPATIENT)
Dept: CARDIOLOGY | Age: 59
End: 2024-04-29

## 2024-04-29 ENCOUNTER — APPOINTMENT (OUTPATIENT)
Dept: CARDIOLOGY | Age: 59
End: 2024-04-29

## 2024-04-29 DIAGNOSIS — H81.10 BENIGN PAROXYSMAL POSITIONAL VERTIGO, UNSPECIFIED LATERALITY: Primary | ICD-10-CM

## 2024-04-29 LAB
ALBUMIN SERPL-MCNC: 3.3 G/DL (ref 3.4–5)
ALBUMIN/GLOB SERPL: 0.9 {RATIO} (ref 1–2)
ALP LIVER SERPL-CCNC: 78 U/L
ALT SERPL-CCNC: 23 U/L
ANION GAP SERPL CALC-SCNC: 6 MMOL/L (ref 0–18)
AST SERPL-CCNC: 28 U/L (ref 15–37)
BASOPHILS # BLD AUTO: 0.06 X10(3) UL (ref 0–0.2)
BASOPHILS NFR BLD AUTO: 1.2 %
BILIRUB SERPL-MCNC: 0.7 MG/DL (ref 0.1–2)
BUN BLD-MCNC: 11 MG/DL (ref 9–23)
CALCIUM BLD-MCNC: 9.8 MG/DL (ref 8.5–10.1)
CHLORIDE SERPL-SCNC: 113 MMOL/L (ref 98–112)
CO2 SERPL-SCNC: 22 MMOL/L (ref 21–32)
CREAT BLD-MCNC: 1.04 MG/DL
EGFRCR SERPLBLD CKD-EPI 2021: 62 ML/MIN/1.73M2 (ref 60–?)
EOSINOPHIL # BLD AUTO: 0.01 X10(3) UL (ref 0–0.7)
EOSINOPHIL NFR BLD AUTO: 0.2 %
ERYTHROCYTE [DISTWIDTH] IN BLOOD BY AUTOMATED COUNT: 12.8 %
GLOBULIN PLAS-MCNC: 3.5 G/DL (ref 2.8–4.4)
GLUCOSE BLD-MCNC: 95 MG/DL (ref 70–99)
HCT VFR BLD AUTO: 42.3 %
HGB BLD-MCNC: 13.7 G/DL
IMM GRANULOCYTES # BLD AUTO: 0.01 X10(3) UL (ref 0–1)
IMM GRANULOCYTES NFR BLD: 0.2 %
LYMPHOCYTES # BLD AUTO: 1.8 X10(3) UL (ref 1–4)
LYMPHOCYTES NFR BLD AUTO: 37 %
MCH RBC QN AUTO: 29.2 PG (ref 26–34)
MCHC RBC AUTO-ENTMCNC: 32.4 G/DL (ref 31–37)
MCV RBC AUTO: 90.2 FL
MONOCYTES # BLD AUTO: 0.44 X10(3) UL (ref 0.1–1)
MONOCYTES NFR BLD AUTO: 9 %
NEUTROPHILS # BLD AUTO: 2.55 X10 (3) UL (ref 1.5–7.7)
NEUTROPHILS # BLD AUTO: 2.55 X10(3) UL (ref 1.5–7.7)
NEUTROPHILS NFR BLD AUTO: 52.4 %
OSMOLALITY SERPL CALC.SUM OF ELEC: 291 MOSM/KG (ref 275–295)
PLATELET # BLD AUTO: 265 10(3)UL (ref 150–450)
POTASSIUM SERPL-SCNC: 4.8 MMOL/L (ref 3.5–5.1)
PROT SERPL-MCNC: 6.8 G/DL (ref 6.4–8.2)
RBC # BLD AUTO: 4.69 X10(6)UL
SODIUM SERPL-SCNC: 141 MMOL/L (ref 136–145)
WBC # BLD AUTO: 4.9 X10(3) UL (ref 4–11)

## 2024-04-29 PROCEDURE — 85025 COMPLETE CBC W/AUTO DIFF WBC: CPT | Performed by: EMERGENCY MEDICINE

## 2024-04-29 PROCEDURE — 70450 CT HEAD/BRAIN W/O DYE: CPT | Performed by: EMERGENCY MEDICINE

## 2024-04-29 PROCEDURE — 99285 EMERGENCY DEPT VISIT HI MDM: CPT

## 2024-04-29 PROCEDURE — 96374 THER/PROPH/DIAG INJ IV PUSH: CPT

## 2024-04-29 PROCEDURE — 93005 ELECTROCARDIOGRAM TRACING: CPT

## 2024-04-29 PROCEDURE — 93010 ELECTROCARDIOGRAM REPORT: CPT

## 2024-04-29 PROCEDURE — 96361 HYDRATE IV INFUSION ADD-ON: CPT

## 2024-04-29 PROCEDURE — 70551 MRI BRAIN STEM W/O DYE: CPT | Performed by: EMERGENCY MEDICINE

## 2024-04-29 PROCEDURE — 80053 COMPREHEN METABOLIC PANEL: CPT | Performed by: EMERGENCY MEDICINE

## 2024-04-29 RX ORDER — LISINOPRIL 5 MG/1
5 TABLET ORAL DAILY
Status: DISCONTINUED | OUTPATIENT
Start: 2024-04-29 | End: 2024-04-30

## 2024-04-29 RX ORDER — MECLIZINE HYDROCHLORIDE 25 MG/1
25 TABLET ORAL ONCE
Status: DISCONTINUED | OUTPATIENT
Start: 2024-04-29 | End: 2024-04-29

## 2024-04-29 RX ORDER — LEVOTHYROXINE SODIUM 0.05 MG/1
50 TABLET ORAL DAILY
COMMUNITY
Start: 2023-04-07

## 2024-04-29 RX ORDER — ACETAMINOPHEN 500 MG
500 TABLET ORAL EVERY 4 HOURS PRN
Status: DISCONTINUED | OUTPATIENT
Start: 2024-04-29 | End: 2024-04-30

## 2024-04-29 RX ORDER — SODIUM CHLORIDE 9 MG/ML
INJECTION, SOLUTION INTRAVENOUS CONTINUOUS
Status: DISCONTINUED | OUTPATIENT
Start: 2024-04-29 | End: 2024-04-30

## 2024-04-29 RX ORDER — ONDANSETRON 2 MG/ML
4 INJECTION INTRAMUSCULAR; INTRAVENOUS ONCE
Status: COMPLETED | OUTPATIENT
Start: 2024-04-29 | End: 2024-04-29

## 2024-04-29 RX ORDER — MECLIZINE HYDROCHLORIDE 25 MG/1
25 TABLET ORAL 3 TIMES DAILY
Status: DISCONTINUED | OUTPATIENT
Start: 2024-04-29 | End: 2024-04-30

## 2024-04-29 RX ORDER — LEVOTHYROXINE SODIUM 0.05 MG/1
50 TABLET ORAL DAILY
Status: DISCONTINUED | OUTPATIENT
Start: 2024-04-30 | End: 2024-04-30

## 2024-04-29 RX ORDER — MECLIZINE HYDROCHLORIDE 25 MG/1
25 TABLET ORAL ONCE
Status: COMPLETED | OUTPATIENT
Start: 2024-04-29 | End: 2024-04-29

## 2024-04-29 RX ORDER — DIAZEPAM 5 MG/1
5 TABLET ORAL ONCE
Status: COMPLETED | OUTPATIENT
Start: 2024-04-29 | End: 2024-04-29

## 2024-04-29 RX ORDER — PROCHLORPERAZINE EDISYLATE 5 MG/ML
5 INJECTION INTRAMUSCULAR; INTRAVENOUS EVERY 8 HOURS PRN
Status: DISCONTINUED | OUTPATIENT
Start: 2024-04-29 | End: 2024-04-30

## 2024-04-29 RX ORDER — ATORVASTATIN CALCIUM 10 MG/1
10 TABLET, FILM COATED ORAL NIGHTLY
Status: DISCONTINUED | OUTPATIENT
Start: 2024-04-29 | End: 2024-04-30

## 2024-04-29 RX ORDER — ONDANSETRON 2 MG/ML
4 INJECTION INTRAMUSCULAR; INTRAVENOUS EVERY 6 HOURS PRN
Status: DISCONTINUED | OUTPATIENT
Start: 2024-04-29 | End: 2024-04-30

## 2024-04-29 RX ORDER — CARVEDILOL 12.5 MG/1
25 TABLET ORAL 2 TIMES DAILY WITH MEALS
Status: DISCONTINUED | OUTPATIENT
Start: 2024-04-29 | End: 2024-04-30

## 2024-04-29 NOTE — PROGRESS NOTES
NURSING ADMISSION NOTE      Patient admitted from home complaints of dizziness and weakness.  AOx4, room air, NSR, VSS  Cardiac diet. Continent  Patient walks to bathroom with assist due to dizziness.  Normal saline 0.9 at 100 ml/hr.  Admission navigator completed.   Oriented to room.  Safety precautions initiated.  Bed in low position.  Call light in reach.

## 2024-04-29 NOTE — ED INITIAL ASSESSMENT (HPI)
Woke with nausea this morning. States was feeling off last night with dizziness and just feels weak. States had to hold the wall while walking last night. Dizziness is worse with turning of head.

## 2024-04-29 NOTE — H&P
[facility]   part of [facility]    History and Physical     Danni Cope Patient Status:  Emergency    1965 MRN [id number]   [facility] EMERGENCY DEPARTMENT Attending Sina Santos MD   Hosp Day # 0 PCP Caro Jeffries MD     Chief Complaint: Dizziness    History of Present Illness: Danni Cope is a 58 year old female with history of hyperlipidemia, hypothyroidism, cardiomyopathy, history of left leg DVT presenting with dizziness.  Patient says last night she felt a little dizzy but did not think too much of it and went to sleep.  She says she woke up at 4 AM this morning unable to walk from one area of her bedroom to the other without having to hold onto the wall.  Patient said she is never felt like this before.  She has had minor episodes of vertigo in the past but she is going to work and on her job but at times.  She said that this morning she was unable to do anything.  As result she came to emergency room for further evaluation and treatment.  Patient denies any other neurological deficits.  Patient still feels her dizziness at this time.  Patient denies room spinning sensation.  Patient denies feeling that she is getting blackout.  Patient feels like she is walk around like a drunken .  Patient denies any other positive review of systems at this time.    Past Medical History:  Past Medical History:    Cardiomyopathy (HCC)    Hyperlipidemia    Left leg DVT (HCC)    Obesity (BMI 30-39.9)    Visual impairment   Hypothyroidism     Past Surgical History:   Past Surgical History:   Procedure Laterality Date    Appendectomy      Cholecystectomy      Hernia surgery      Hysterectomy      Other surgical history  2013    cardiac ablation    Skin surgery      lipoma wrist and thigh    Total abdom hysterectomy      no associated bleeeding complications    Robertson teeth removed         Social History:  reports that she has never smoked. She has never used  smokeless tobacco. She reports current alcohol use. She reports that she does not use drugs. No illegal drugs, , 2 children, working, no cane or walker    Family History:   Family History   Adopted: Yes   Problem Relation Age of Onset    Cancer Father         lung; tobacco user   Mother passed - MS  Father passed - lung cancer  Adopted    Allergies:   Allergies   Allergen Reactions    Pcns [Penicillins] HIVES    Strawberries Tightness in Throat       Medications:    No current facility-administered medications on file prior to encounter.     Current Outpatient Medications on File Prior to Encounter   Medication Sig Dispense Refill    levothyroxine 50 MCG Oral Tab Take 1 tablet (50 mcg total) by mouth daily.      apixaban (ELIQUIS) 2.5 MG Oral Tab Take 1 tablet (2.5 mg total) by mouth 2 (two) times daily. 60 tablet 11    lisinopril 5 MG Oral Tab Take 1 tablet (5 mg total) by mouth daily.  2    Atorvastatin Calcium 10 MG Oral Tab Take 1 tablet (10 mg total) by mouth nightly.      carvedilol 25 MG Oral Tab Take 1 tablet (25 mg total) by mouth 2 (two) times daily with meals.         Review of Systems:   A comprehensive 14 point review of systems was completed.    Pertinent positives and negatives noted in the HPI.    Physical Exam:    /80   Pulse 69   Temp 97.6 °F (36.4 °C) (Temporal)   Resp 24   Ht 5' 3\" (1.6 m)   Wt 185 lb (83.9 kg)   LMP 04/29/2005   SpO2 99%   BMI 32.77 kg/m²   General: No acute distress. Alert and oriented x 3.  HEENT: Normocephalic atraumatic. Moist mucous membranes. EOM-I.  Neck:No JVD. No carotid bruits.  Respiratory: Clear to auscultation bilaterally. No wheezes. No crackles  Cardiovascular: S1, S2. Regular rate and rhythm. No murmurs  Chest and Back: No tenderness or deformity.  Abdomen: Soft, nontender, nondistended.  Positive bowel sounds. No rebound, guarding  Neurologic: No focal neurological deficits. CNII-XII grossly intact.  Sensation and strength  intact  Musculoskeletal: Moves all extremities.  Extremities: No edema or tenderness of the lower extremities  Integument: No new rashes or lesions.   Psychiatric: Appropriate mood and affect.      Diagnostic Data:      Labs:  Recent Labs   Lab 04/29/24  0624   WBC 4.9   HGB 13.7   MCV 90.2   .0       Recent Labs   Lab 04/29/24  0624   GLU 95   BUN 11   CREATSERUM 1.04*   CA 9.8   ALB 3.3*      K 4.8   *   CO2 22.0   ALKPHO 78   AST 28   ALT 23   BILT 0.7   TP 6.8       Estimated Creatinine Clearance: 48.2 mL/min (A) (based on SCr of 1.04 mg/dL (H)).    No results for input(s): \"PTP\", \"INR\" in the last 168 hours.    No results for input(s): \"TROP\", \"CK\" in the last 168 hours.    Imaging: Imaging data reviewed in Epic.      ASSESSMENT / PLAN:   58 year old female with history of hyperlipidemia, hypothyroidism, cardiomyopathy, history of left leg DVT presenting with dizziness.      Dizziness  -Etiology uncertain  -CT brain shows no acute pathology  -MRI brain shows no acute pathology  -Symptoms may be secondary to peripheral vertigo  -Will give patient meclizine 25 p.o. 3 times daily scheduled  -Start patient IV fluid 0.9 normal saline 100 cc an hour  -Consult neurology if patient's symptoms not improved tomorrow  -No signs of cardiac arrhythmias, will continue monitor on telemetry  -No signs of seizure-like activity    Hypothyroidism  -Levothyroxine    Cardiomyopathy  -Lisinopril  -Carvedilol    Hyperlipidemia  -Atorvastatin     History of Left Leg DVT  -eliquis    Quality:  DVT Prophylaxis: scd, eliquis  CODE status:   Howard: none    Plan of care discussed with patient and staff    Dispo: no discharge    Alfred Pelayo MD  [facility] Hospitalist  [phone]

## 2024-04-29 NOTE — ED QUICK NOTES
Pt attempted to ambulate. Pt is very unstable and cannot walk without assistance. Pt lives alone.

## 2024-04-29 NOTE — ED PROVIDER NOTES
Patient Seen in: [facility] Emergency Department      History     Chief Complaint   Patient presents with    Nausea/Vomiting/Diarrhea     Nausea, feels weak     Stated Complaint: Nausea    Subjective:   HPI    58-year-old female complaining of nausea.  Patient describes that she felt a little bit dizziness last night when she leaned over before she went to bed about 10:00 and then when she woke up at 430 this morning to get up from the bathroom she felt the room spinning and felt very nauseous had to hold onto the walls.  When she got back in bed it subsided somewhat but did not go away completely and came in by EMS about an hour after that.  Patient denies any recent cold symptoms she has had vestibular problem in the past that seems somewhat different.  No recent earache runny nose sore throat she denies any headache blurred vision speech difficulty or localizing numbness or weakness.  She has a history of cardiomyopathy however recent ejection fraction had improved from 30% to 50%.  Patient does have a history of DVT in November last year and is on Eliquis.  She denies any bleeding anywhere.  There is no chest pain shortness of breath or palpitations.  Patient is alert orient x 3 no acute distress HEENT exam pupils are equal round react light extract muscles are intact oropharynx is clear there is no facial asymmetry tongue is midline tympanic membrane's are normal bilaterally    Objective:   Past Medical History:    Cardiomyopathy (HCC)    Hyperlipidemia    Left leg DVT (HCC)    Obesity (BMI 30-39.9)    Visual impairment              Past Surgical History:   Procedure Laterality Date    Appendectomy      Cholecystectomy      Hernia surgery      Hysterectomy  2004    Other surgical history  2013    cardiac ablation    Skin surgery      lipoma wrist and thigh    Total abdom hysterectomy  2011    no associated bleeeding complications    [place] teeth removed                  Social History     Socioeconomic  History    Marital status:    Tobacco Use    Smoking status: Never    Smokeless tobacco: Never   Vaping Use    Vaping status: Never Used   Substance and Sexual Activity    Alcohol use: Yes     Comment: social    Drug use: Never   Social History Narrative    Danni is . She has 2 adult daughters. 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 She lives alone in [place], [place]     Social Determinants of Health     Food Insecurity: No Food Insecurity (4/29/2024)    Food Insecurity     Food Insecurity: Never true   Transportation Needs: No Transportation Needs (4/29/2024)    Transportation Needs     Lack of Transportation: No   Physical Activity: Sufficiently Active (9/20/2023)    [facility]    Exercise Vital Sign     On average, how many days per week do you engage in moderate to strenuous exercise (like a brisk walk)?: 5 days     On average, how many minutes do you engage in exercise at this level?: 30 min   Housing Stability: Low Risk  (4/29/2024)    Housing Stability     Housing Instability: No              Review of Systems    Positive for stated complaint: Nausea  Other systems are as noted in HPI.  Constitutional and vital signs reviewed.      All other systems reviewed and negative except as noted above.    Physical Exam     ED Triage Vitals [04/29/24 0620]   /77   Pulse 62   Resp 16   Temp 97.6 °F (36.4 °C)   Temp src Temporal   SpO2 100 %   O2 Device None (Room air)       Current:/71   Pulse 69   Temp 98.3 °F (36.8 °C) (Oral)   Resp 16   Ht 160 cm (5' 3\")   Wt 83.9 kg   LMP 04/29/2005   SpO2 99%   BMI 32.77 kg/m²         Physical Exam    Neck is supple no nuchal rigidity lymphadenopathy lungs are clear cardiovascular exam shows regular rate and rhythm without murmurs abdomen soft and nontender.  Mental status alert and oriented ×3  Cranial nerves II through XII within normal limits  Motor  function is intact in all 4 extremities  Sensation is intact in all 4 extremities  Cerebellar finger-nose and heel-to-shin are normal  Deep tendon reflexes are normal  The dizziness is triggered by movement of the head.    ED Course     Labs Reviewed   COMP METABOLIC PANEL (14) - Abnormal; Notable for the following components:       Result Value    Chloride 113 (*)     Creatinine 1.04 (*)     Albumin 3.3 (*)     A/G Ratio 0.9 (*)     All other components within normal limits   CBC WITH DIFFERENTIAL WITH PLATELET    Narrative:     The following orders were created for panel order CBC With Differential With Platelet.  Procedure                               Abnormality         Status                     ---------                               -----------         ------                     CBC W/ DIFFERENTIAL[[id number]]                              Final result                 Please view results for these tests on the individual orders.   RAINBOW DRAW LAVENDER   RAINBOW DRAW LIGHT GREEN   RAINBOW DRAW BLUE   CBC W/ DIFFERENTIAL     EKG    Rate, intervals and axes as noted on EKG Report.  Rate: 58  Rhythm: Sinus bradycardia  Reading: Sinus bradycardia minimal criteria for LVH might be normal variant borderline EKG              Abnormal Labs Reviewed   COMP METABOLIC PANEL (14) - Abnormal; Notable for the following components:       Result Value    Chloride 113 (*)     Creatinine 1.04 (*)     Albumin 3.3 (*)     A/G Ratio 0.9 (*)     All other components within normal limits          MRI BRAIN (CPT=70551)    Result Date: 4/29/2024  CONCLUSION:  1. No acute infarct, acute intracranial hemorrhage, or hydrocephalus. 2. Trace chronic small vessel ischemic disease.   LOCATION:  [facility]   Dictated by (CST): Stromberg, LeRoy, MD on 4/29/2024 at 11:36 AM     Finalized by (CST): Stromberg, LeRoy, MD on 4/29/2024 at 11:41 AM       CT BRAIN OR HEAD (90102)    Result Date: 4/29/2024  CONCLUSION:  No acute intracranial abnormality  identified.    LOCATION:  [facility]   Dictated by (CST): Davide Boateng MD on 4/29/2024 at 7:38 AM     Finalized by (CST): Davide Boateng MD on 4/29/2024 at 7:39 AM      Images independently reviewed there is no infarct or intracranial hemorrhage         MDM      Initial differential diagnoses considered but not limited to includes benign positional vertigo vestibular neuritis sinusitis cardiac arrhythmia dehydration gastroenteritis intracranial hemorrhage stroke  Admission disposition: 4/29/2024 12:39 PM         Patient had minimal improvement with meclizine and diazepam and had considerable difficulty ambulating felt unsafe to go home and was admitted no evidence of CVA or mass.                               Medical Decision Making      Disposition and Plan     Clinical Impression:  1. Benign paroxysmal positional vertigo, unspecified laterality         Disposition:  Admit  4/29/2024 12:39 pm    Follow-up:  No follow-up provider specified.        Medications Prescribed:  Current Discharge Medication List                            Hospital Problems       Present on Admission  Date Reviewed: 11/15/2023            ICD-10-CM Noted POA    * (Principal) Benign paroxysmal positional vertigo, unspecified laterality H81.10 4/29/2024 Unknown

## 2024-04-29 NOTE — ED QUICK NOTES
Orders for admission, patient is aware of plan and ready to go upstairs. Any questions, please call ED RN Elissa at extension [phone].     Patient Covid vaccination status: Fully vaccinated     COVID Test Ordered in ED: None    COVID Suspicion at Admission: N/A    Running Infusions:      Mental Status/LOC at time of transport: A&Ox4    Other pertinent information:   CIWA score: N/A   NIH score:  N/A

## 2024-04-29 NOTE — ED QUICK NOTES
Patient was assisted to CT table by a stand and pivot.  She complains of being dizzy during the transfer.

## 2024-04-29 NOTE — ED QUICK NOTES
Pt rpts that when she doesn't move the dizziness subsides, but as soon as she moves the dizziness returns.

## 2024-04-30 VITALS
BODY MASS INDEX: 32.78 KG/M2 | DIASTOLIC BLOOD PRESSURE: 56 MMHG | RESPIRATION RATE: 18 BRPM | OXYGEN SATURATION: 97 % | TEMPERATURE: 98 F | WEIGHT: 185 LBS | SYSTOLIC BLOOD PRESSURE: 95 MMHG | HEIGHT: 63 IN | HEART RATE: 68 BPM

## 2024-04-30 LAB
ALBUMIN SERPL-MCNC: 3 G/DL (ref 3.4–5)
ALBUMIN/GLOB SERPL: 1 {RATIO} (ref 1–2)
ALP LIVER SERPL-CCNC: 75 U/L
ALT SERPL-CCNC: 20 U/L
ANION GAP SERPL CALC-SCNC: 3 MMOL/L (ref 0–18)
AST SERPL-CCNC: 12 U/L (ref 15–37)
ATRIAL RATE: 58 BPM
BASOPHILS # BLD AUTO: 0.03 X10(3) UL (ref 0–0.2)
BASOPHILS NFR BLD AUTO: 0.8 %
BILIRUB SERPL-MCNC: 0.7 MG/DL (ref 0.1–2)
BUN BLD-MCNC: 14 MG/DL (ref 9–23)
CALCIUM BLD-MCNC: 9.2 MG/DL (ref 8.5–10.1)
CHLORIDE SERPL-SCNC: 114 MMOL/L (ref 98–112)
CO2 SERPL-SCNC: 25 MMOL/L (ref 21–32)
CREAT BLD-MCNC: 0.97 MG/DL
EGFRCR SERPLBLD CKD-EPI 2021: 67 ML/MIN/1.73M2 (ref 60–?)
EOSINOPHIL # BLD AUTO: 0.03 X10(3) UL (ref 0–0.7)
EOSINOPHIL NFR BLD AUTO: 0.8 %
ERYTHROCYTE [DISTWIDTH] IN BLOOD BY AUTOMATED COUNT: 12.9 %
GLOBULIN PLAS-MCNC: 2.9 G/DL (ref 2.8–4.4)
GLUCOSE BLD-MCNC: 90 MG/DL (ref 70–99)
HCT VFR BLD AUTO: 38.2 %
HGB BLD-MCNC: 11.9 G/DL
IMM GRANULOCYTES # BLD AUTO: 0 X10(3) UL (ref 0–1)
IMM GRANULOCYTES NFR BLD: 0 %
LYMPHOCYTES # BLD AUTO: 1.72 X10(3) UL (ref 1–4)
LYMPHOCYTES NFR BLD AUTO: 43.8 %
MAGNESIUM SERPL-MCNC: 2.2 MG/DL (ref 1.6–2.6)
MCH RBC QN AUTO: 29.1 PG (ref 26–34)
MCHC RBC AUTO-ENTMCNC: 31.2 G/DL (ref 31–37)
MCV RBC AUTO: 93.4 FL
MONOCYTES # BLD AUTO: 0.31 X10(3) UL (ref 0.1–1)
MONOCYTES NFR BLD AUTO: 7.9 %
NEUTROPHILS # BLD AUTO: 1.84 X10 (3) UL (ref 1.5–7.7)
NEUTROPHILS # BLD AUTO: 1.84 X10(3) UL (ref 1.5–7.7)
NEUTROPHILS NFR BLD AUTO: 46.7 %
OSMOLALITY SERPL CALC.SUM OF ELEC: 294 MOSM/KG (ref 275–295)
P AXIS: 7 DEGREES
P-R INTERVAL: 174 MS
PLATELET # BLD AUTO: 205 10(3)UL (ref 150–450)
POTASSIUM SERPL-SCNC: 3.8 MMOL/L (ref 3.5–5.1)
PROT SERPL-MCNC: 5.9 G/DL (ref 6.4–8.2)
Q-T INTERVAL: 426 MS
QRS DURATION: 88 MS
QTC CALCULATION (BEZET): 418 MS
R AXIS: -17 DEGREES
RBC # BLD AUTO: 4.09 X10(6)UL
SODIUM SERPL-SCNC: 142 MMOL/L (ref 136–145)
T AXIS: 34 DEGREES
VENTRICULAR RATE: 58 BPM
WBC # BLD AUTO: 3.9 X10(3) UL (ref 4–11)

## 2024-04-30 PROCEDURE — 83735 ASSAY OF MAGNESIUM: CPT | Performed by: HOSPITALIST

## 2024-04-30 PROCEDURE — 80053 COMPREHEN METABOLIC PANEL: CPT | Performed by: HOSPITALIST

## 2024-04-30 PROCEDURE — 85025 COMPLETE CBC W/AUTO DIFF WBC: CPT | Performed by: HOSPITALIST

## 2024-04-30 RX ORDER — MECLIZINE HYDROCHLORIDE 25 MG/1
TABLET ORAL
Qty: 15 TABLET | Refills: 0 | Status: SHIPPED | OUTPATIENT
Start: 2024-04-30

## 2024-04-30 RX ORDER — LEVOTHYROXINE SODIUM 0.05 MG/1
50 TABLET ORAL DAILY
Status: DISCONTINUED | OUTPATIENT
Start: 2024-04-30 | End: 2024-04-30

## 2024-04-30 NOTE — PLAN OF CARE
PT A/O, STATES DIZZINESS IS BETTER, 93% ON RA, SR, SCDS ON, DENIES PAIN OR NAUSEA, TAKING MECLIZINE, UP VOIDING IN BATHROOM WITH SBA, IVF INFUSING, INSTRUCTED PT ON POC, BED ALARM ON    Problem: SAFETY ADULT - FALL  Goal: Free from fall injury  Description: INTERVENTIONS:  - Assess pt frequently for physical needs  - Identify cognitive and physical deficits and behaviors that affect risk of falls.  - [place] fall precautions as indicated by assessment.  - Educate pt/family on patient safety including physical limitations  - Instruct pt to call for assistance with activity based on assessment  - Modify environment to reduce risk of injury  - Provide assistive devices as appropriate  - Consider OT/PT consult to assist with strengthening/mobility  - Encourage toileting schedule  4/30/2024 0230 by Trinidad Sigala, RN  Outcome: Progressing  4/30/2024 0230 by Trinidad Sigala, RN  Outcome: Progressing

## 2024-04-30 NOTE — PLAN OF CARE
Pt discharged home via wheelchair. Prescriptions and discharge intructions given with pt verbalizing understanding.

## 2024-04-30 NOTE — PROGRESS NOTES
[facility] Hospitalist Progress Note                                                                     [facility]   part of [facility]      Danni Cope  6/11/1965    SUBJECTIVE: Patient denies any chest pain, palpitations, shortness of breath cough, nausea, vomiting, abdominal pain.  Patient dizziness significantly improved.  Patient able to ambulate well.    OBJECTIVE:  Temp:  [98 °F (36.7 °C)-98.3 °F (36.8 °C)] 98.2 °F (36.8 °C)  Pulse:  [59-74] 59  Resp:  [11-24] 16  BP: ()/(45-80) 106/54  SpO2:  [93 %-100 %] 97 %  Exam  Gen: No acute distress, alert and oriented x3, no focal neurologic deficits  Pulm: Lungs clear bilaterally, normal respiratory effort, no crackles, no wheezing  CV: Heart with regular rate and rhythm, no murmur.  Abd: Abdomen soft, nontender, nondistended, bowel sounds present  MSK: no significant pitting edema or tenderness of the LE  Skin: no new rashes or lesions    Labs:   Recent Labs   Lab 04/29/24  0624 04/30/24  0454   WBC 4.9 3.9*   HGB 13.7 11.9*   MCV 90.2 93.4   .0 205.0       Recent Labs   Lab 04/29/24  0624 04/30/24  0454    142   K 4.8 3.8   * 114*   CO2 22.0 25.0   BUN 11 14   CREATSERUM 1.04* 0.97   CA 9.8 9.2   MG  --  2.2   GLU 95 90       Recent Labs   Lab 04/29/24  0624 04/30/24  0454   ALT 23 20   AST 28 12*   ALB 3.3* 3.0*       No results for input(s): \"PGLU\" in the last 168 hours.    Meds:   Scheduled:    levothyroxine  50 mcg Oral Daily    apixaban  2.5 mg Oral BID    atorvastatin  10 mg Oral Nightly    carvedilol  25 mg Oral BID with meals    lisinopril  5 mg Oral Daily    meclizine  25 mg Oral TID     Continuous Infusions:    sodium chloride 100 mL/hr at 04/30/24 0649     PRN:   acetaminophen    ondansetron    prochlorperazine    ASSESSMENT / PLAN:   58 year old female with history of hyperlipidemia, hypothyroidism, cardiomyopathy, history of left leg DVT presenting with dizziness.        Dizziness--> improved  -Etiology uncertain  -CT brain shows no acute pathology  -MRI brain shows no acute pathology  -Symptoms may be secondary to peripheral vertigo  -Will give patient meclizine 25 p.o. 3 times daily scheduled--> continued for 24 more hours then prn on dc  -Start patient IV fluid 0.9 normal saline 100 cc an hour  -No signs of cardiac arrhythmias, will continue monitor on telemetry  -No signs of seizure-like activity     Hypothyroidism  -Levothyroxine     Cardiomyopathy  -Lisinopril  -Carvedilol     Hyperlipidemia  -Atorvastatin      History of Left Leg DVT  -eliquis     Quality:  DVT Prophylaxis: scd, eliquis  CODE status:   [place]: none     Plan of care discussed with patient and staff     Dispo:  discharge latter today if doing well     Alfred Pelayo MD  [facility] Hospitalist  [phone]

## 2024-05-02 ENCOUNTER — ORDER TRANSCRIPTION (OUTPATIENT)
Dept: PHYSICAL THERAPY | Facility: HOSPITAL | Age: 59
End: 2024-05-02

## 2024-05-02 DIAGNOSIS — H81.10 BPPV (BENIGN PAROXYSMAL POSITIONAL VERTIGO): Primary | ICD-10-CM

## 2024-05-03 ENCOUNTER — TELEPHONE (OUTPATIENT)
Dept: NEUROLOGY | Facility: CLINIC | Age: 59
End: 2024-05-03

## 2024-05-03 NOTE — TELEPHONE ENCOUNTER
Spoke with pt advised she works here in [place]. Advised ambulance rushed her to [facility] and not here. Advised she knows dr mcgrath and would prefer to continue care here advised she had a recent MRA this upcoming Tuesday. Pt doesn't want to go to [place] office despite being closer to her.

## 2024-05-03 NOTE — TELEPHONE ENCOUNTER
Well noted would it be [place] next available or should we refer her to the [place] office since seen in [facility]?

## 2024-05-03 NOTE — TELEPHONE ENCOUNTER
Pt was recently hospitalized 4/29. Pt was advised to f/ up with dr mcgrath. Would be a new a NP. Pls advise where to schedule pt.

## 2024-05-03 NOTE — PAYOR COMM NOTE
Discharge Notification    Patient Name: JACQUELINE ESCAMILLA  Payor: [employer] [facility] [employer]  Subscriber #: [id number]  Authorization Number: [id number]  Admit Date/Time: 4/29/2024 6:16 AM  Discharge Date/Time: 4/30/2024 4:00 PM

## 2024-05-06 ENCOUNTER — TELEPHONE (OUTPATIENT)
Dept: PHYSICAL THERAPY | Facility: HOSPITAL | Age: 59
End: 2024-05-06

## 2024-05-07 ENCOUNTER — HOSPITAL ENCOUNTER (OUTPATIENT)
Dept: MRI IMAGING | Facility: HOSPITAL | Age: 59
Discharge: HOME | End: 2024-05-07
Attending: INTERNAL MEDICINE
Payer: COMMERCIAL

## 2024-05-07 ENCOUNTER — OFFICE VISIT (OUTPATIENT)
Dept: PHYSICAL THERAPY | Facility: HOSPITAL | Age: 59
End: 2024-05-07
Attending: INTERNAL MEDICINE
Payer: COMMERCIAL

## 2024-05-07 DIAGNOSIS — H81.12 BENIGN PAROXYSMAL POSITIONAL VERTIGO OF LEFT EAR: Primary | ICD-10-CM

## 2024-05-07 DIAGNOSIS — R42 DIZZINESS: ICD-10-CM

## 2024-05-07 PROCEDURE — 97161 PT EVAL LOW COMPLEX 20 MIN: CPT

## 2024-05-07 PROCEDURE — 95992 CANALITH REPOSITIONING PROC: CPT

## 2024-05-07 PROCEDURE — 70544 MR ANGIOGRAPHY HEAD W/O DYE: CPT | Performed by: INTERNAL MEDICINE

## 2024-05-07 NOTE — PROGRESS NOTES
VESTIBULAR EVALUATION:     Diagnosis:   BPPV (benign paroxysmal positional vertigo)       Referring Provider: Caro Jeffries  Date of Evaluation:    5/7/2024    Precautions:  None Next MD visit:   none scheduled  Date of Surgery: n/a     PATIENT SUMMARY   Danni Cope is a 58 year old female who presents to therapy today with reports of dizziness and vertigo. She states that she woke on 4/29/24 with severe dizziness and nausea. Initially thought she might be dehydrated, had to crawl to the bathroom for water, however, dizziness continued. States she stayed in bed for over an hour with eyes closed, but symptoms did not stop, so went to the ED. Had imaging of the brain done that was unremarkable. Was admitted due to severity of symptoms. Was given meclizine and discharged the following morning. States the next few days felt gradually better and symptoms have since resolved. Present today to be assessed and ensure no further issues. Also has MRA of the brain scheduled for this evening. She has BPPV in 2020, symptoms were varied from that occasion.     Patient denies: Recent illness, recent boat/train travel, hitting her head, MVA, previous concussion, other known neurological issue, issue with BP, CV diagnoses other than known cardiomyopathy, change in medication.     Falls: None, denies  Medication: Given meclizine, has not continued to take due to side effects and resolution of dizziness  Imaging: CT and MRI of brain unremarkable  Vision: Denies double vision/blurry vision  Visual Motion: Denies, but has always had motion sickness  N/V: Resolved  N/T: Denies    Symptoms with cough/sneeze or loud noise: No   Falls: No   Hx of migraines: No   Hx of vision issue: No   Hx of hearing issues: none     Dizziness: Current 0/10, Best 0/10, Worst 10/10  Quality: kind of spinning, hard to explain  Frequency/Duration: Constant for 24 hours  Aggravates: Unsure, when she was dizzy, could not do anything, everything was  bothersome  Relieves: Not moving    Headache:   Location: [place], wondering if this is related to allergies, this is typically the time of year they are worst for her  Frequency/Duration:  can vary in length, usually not longer than an hour  Aggravates: Unsure   Relieves: Tylenol when needed    Cervical pain: None, denies    Dizziness Handicap Inventory (DHI): 8/100     Current functional limitations include currently none, states that she is no longer restricting any activity and really would like to get back to work.   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   Pt goals include to return to work and PLOF.   Past medical history was reviewed with Danni. Significant findings include:  Past Medical History:    Cardiomyopathy (HCC)    Hyperlipidemia    Left leg DVT (HCC)    Obesity (BMI 30-39.9)    Visual impairment         ASSESSMENT  Danni is a 58 year old female who presents for skilled physical therapy evaluation on 5/7/24 with primary c/o dizziness and vertigo that began without known cause on 4/29/24. The results of the objective tests and measures show vestibulo-ocular and oculomotor assessments were unremarkable. Positional assessment positive for subjective BPPV with L Maggie-Hallpike, however, no nystagmus noted x 2.  Functional deficits include but are not limited to none currently.  Signs and symptoms are consistent with diagnosis of possible L posterior canal canalithiasis BPPV. Pt and PT discussed evaluation findings, pathology, POC and HEP.  Pt voiced understanding and performs HEP correctly. Skilled Physical Therapy is medically necessary to address the above impairments and reach functional goals.    OBJECTIVE:   Physical Exam:  Posture/Observation: Patient sits and stands with appropriate posturing; she is  pleasant, oriented, compliant, and appropriate, expresses motivation to improve.    Neuro Screen: Sensation: light touch intact     Coordination Testing:   Finger to Nose: WNL   Pronation/supination: WNL   Toe tapping: WNL     Cervical spine ROM: WNL throughout, asymptomatic  Adverse neuro signs with ROM: yes no: no     Occulomotor & Vestibulo-Ocular Exam:  Spontaneous Nystagmus: room light: Negative ;  fixation blocked: NT  Smooth Pursuit: Negative  Saccades: Negative  Gaze Evoked Nystagmus:  room light: Negative; fixation blocked: Not Tested  Head Thrust: Negative  VOR screen:  Negative     VOR Cancellation: Negative   Convergence: Negative  Cover/Uncover:  Negative  Cross Cover:  Negative  Head Shaking Nystagmus: Negative  Dynamic Visual Acuity:  Not Tested    Positional Testing:   Maggie-Hallpike: R Negative, L Positive for brief subjective dizziness x ~5 sec x 2   Roll Test (HC): Negative      Postural Control:   Romberg: EO >30 sec, EC >30 sec   Romberg on Foam: EO >30 sec, EC >30 sec   Tandem Stance: NT  SLS: NT    Functional Mobility:   Gait: pt ambulates on level ground without AD or deviation  Dynamic Gait Index (DGI): 24/24   Timed Up and Go: NT, TBA in future as appropriate   Stairs:NT, TBA in future as appropriate     Today's Treatment and Response: Performed CRM L Epley x 2 due to subjective BPPV, no nystagmus visualized this date. Patient feeling well and able to ambulate IND from session. Educated that will give her 2 weeks to continue to assess symptoms, should dizziness return in that time, should return to PT. If continues to feel well, will cancel remaining appts.   Pt education was provided on exam findings, treatment diagnosis, treatment plan, expectations, and prognosis. Pt was also provided recommendations for possible dizziness after evaluation and possible fatigue after evaluation .   Patient was instructed in and issued a HEP for: continued symptom monitoring    Charges: PT Mitchell Whaley  Complexity, CRM x 1      Total Timed Treatment: 15 min     Total Treatment Time: 50 min     Based on clinical rationale and outcome measures, this evaluation involved Low Complexity decision making.  PLAN OF CARE:    Goals: (To be met in 4 visits)  Patient will report 0/10 dizziness with all normal activity x at least 2 weeks.   Patient will demonstrate ability to perform rolling R/L without dizziness.   Patient will report return to all work activities without dizziness or limitation.   Patient will be IND and compliant in HEP to maintain progress made in PT.       Frequency / Duration: Patient will be seen for 1-2 x/week or a total of 8 visits over a 90 day period. Treatment will include: home exercise program development and instruction, patient/family education, balance training, canalith repositioning maneuver, eye/head coordination exercises, sensory organization training, optokinetic stimulation , ROM, exertion training, gait training, manual therapy, and strengthening. Based on resolving symptoms, patient to be placed on hold x 6-8 weeks (will f/u via telephone at latest 2 weeks from eval), should patient note regression or return of symptoms in that time, may return for further services as needed. If continues to do well, will consider this date D/C from PT.     Education or treatment limitation: None   Rehab Potential: good     Patient/Family/Caregiver was advised of these findings, precautions, and treatment options and has agreed to actively participate in planning and for this course of care.     Thank you for your referral. Please co-sign or sign and return this letter via fax as soon as possible to [phone]. If you have any questions, please contact me at Dept: [phone]    Sincerely,  Electronically signed by therapist: Margarita Johnson, PT, DPT  Physician's certification required: Yes  I certify the need for these services furnished under this plan of treatment and while under my  care.    X___________________________________________________ Date____________________    Certification From: 5/7/2024  To:8/5/2024

## 2024-05-09 ENCOUNTER — APPOINTMENT (OUTPATIENT)
Dept: PHYSICAL THERAPY | Facility: HOSPITAL | Age: 59
End: 2024-05-09
Attending: INTERNAL MEDICINE
Payer: COMMERCIAL

## 2024-05-13 ENCOUNTER — APPOINTMENT (OUTPATIENT)
Dept: PHYSICAL THERAPY | Facility: HOSPITAL | Age: 59
End: 2024-05-13
Attending: INTERNAL MEDICINE
Payer: COMMERCIAL

## 2024-05-21 ENCOUNTER — OFFICE VISIT (OUTPATIENT)
Dept: OTOLARYNGOLOGY | Facility: CLINIC | Age: 59
End: 2024-05-21

## 2024-05-21 DIAGNOSIS — R94.118 ABNORMAL VIDEONYSTAGMOGRAM (VNG): Primary | ICD-10-CM

## 2024-05-21 DIAGNOSIS — R42 DIZZINESS: ICD-10-CM

## 2024-05-21 PROCEDURE — 99203 OFFICE O/P NEW LOW 30 MIN: CPT | Performed by: OTOLARYNGOLOGY

## 2024-05-24 ENCOUNTER — APPOINTMENT (OUTPATIENT)
Dept: PHYSICAL THERAPY | Facility: HOSPITAL | Age: 59
End: 2024-05-24
Attending: INTERNAL MEDICINE
Payer: COMMERCIAL

## 2024-05-24 NOTE — PROGRESS NOTES
Danni Cope is a 58 year old female.    Chief Complaint   Patient presents with    Dizziness     Vertigo and wax build-up left side  Pt had severe episode a month ago       HISTORY OF PRESENT ILLNESS    She presents with a history of onset of imbalance that occurred about a month ago.  Severe enough that she was admitted to the hospital for several days with multiple studies including MRA MRI demonstrating no intracranial abnormalities.  Diagnosed with benign positional vertigo.  Sent for further evaluation and management.  As she she has been doing somewhat better recently.  Episodes seem to occur with movements.  No otologic signs or symptoms.    Social History     Socioeconomic History    Marital status:    Tobacco Use    Smoking status: Never    Smokeless tobacco: Never   Vaping Use    Vaping status: Never Used   Substance and Sexual Activity    Alcohol use: Yes     Comment: social    Drug use: Never       Family History   Adopted: Yes   Problem Relation Age of Onset    Cancer Father         lung; tobacco user       Past Medical History:    Cardiomyopathy (HCC)    Hyperlipidemia    Left leg DVT (HCC)    Obesity (BMI 30-39.9)    Visual impairment       Past Surgical History:   Procedure Laterality Date    Appendectomy      Cholecystectomy      Hernia surgery      Hysterectomy  2004    Other surgical history  2013    cardiac ablation    Skin surgery      lipoma wrist and thigh    Total abdom hysterectomy  2011    no associated bleeeding complications    [place] teeth removed           REVIEW OF SYSTEMS    System Neg/Pos Details   Constitutional Negative Fatigue, fever and weight loss.   ENMT Negative Drooling.   Eyes Negative Blurred vision and vision changes.   Respiratory Negative Dyspnea and wheezing.   Cardio Negative Chest pain, irregular heartbeat/palpitations and syncope.   GI Negative Abdominal pain and diarrhea.   Endocrine Negative Cold intolerance and heat intolerance.   Neuro Negative  Tremors.   Psych Negative Anxiety and depression.   Integumentary Negative Frequent skin infections, pigment change and rash.   Hema/Lymph Negative Easy bleeding and easy bruising.           PHYSICAL EXAM    LMP 04/29/2005        Constitutional Normal Overall appearance - Normal.   Psychiatric Normal Orientation - Oriented to time, place, person & situation. Appropriate mood and affect.   Neck Exam Normal Inspection - Normal. Palpation - Normal. Parotid gland - Normal. Thyroid gland - Normal.   Eyes Normal Conjunctiva - Right: Normal, Left: Normal. Pupil - Right: Normal, Left: Normal. Fundus - Right: Normal, Left: Normal.   Neurological Normal Memory - Normal. Cranial nerves - Cranial nerves II through XII grossly intact.   Head/Face Normal Facial features - Normal. Eyebrows - Normal. Skull - Normal.        Nasopharynx Normal External nose - Normal. Lips/teeth/gums - Normal. Tonsils - Normal. Oropharynx - Normal.   Ears Normal Inspection - Right: Normal, Left: Normal. Canal - Right: Normal, Left: Normal. TM - Right: Normal, Left: Normal.   Skin Normal Inspection - Normal.        Lymph Detail Normal Submental. Submandibular. Anterior cervical. Posterior cervical. Supraclavicular.        Nose/Mouth/Throat Normal External nose - Normal. Lips/teeth/gums - Normal. Tonsils - Normal. Oropharynx - Normal.   Nose/Mouth/Throat Normal Nares - Right: Normal Left: Normal. Septum -Normal  Turbinates - Right: Normal, Left: Normal.       Current Outpatient Medications:     meclizine 25 MG Oral Tab, 1 tablet po tid for 24 hrs then as needed for dizziness, Disp: 15 tablet, Rfl: 0    levothyroxine 50 MCG Oral Tab, Take 1 tablet (50 mcg total) by mouth daily., Disp: , Rfl:     apixaban (ELIQUIS) 2.5 MG Oral Tab, Take 1 tablet (2.5 mg total) by mouth 2 (two) times daily., Disp: 60 tablet, Rfl: 11    lisinopril 5 MG Oral Tab, Take 1 tablet (5 mg total) by mouth daily., Disp: , Rfl: 2    Atorvastatin Calcium 10 MG Oral Tab, Take 1 tablet  (10 mg total) by mouth nightly., Disp: , Rfl:     carvedilol 25 MG Oral Tab, Take 1 tablet (25 mg total) by mouth 2 (two) times daily with meals., Disp: , Rfl:   ASSESSMENT AND PLAN    1. Abnormal videonystagmogram (VNG)    - Audiology Referral - In Network    2. Dizziness  I have recommended that she consider undergoing an audiogram as well as a balance test to look for the possible source of her imbalance.  She return to see me after that study.  - Audiology Referral - In Network        This note was prepared using Dragon Medical voice recognition dictation software. As a result errors may occur. When identified these errors have been corrected. While every attempt is made to correct errors during dictation discrepancies may still exist    Jagdish Virgen MD    5/23/2024    11:50 PM

## 2024-06-07 ENCOUNTER — LAB ENCOUNTER (OUTPATIENT)
Dept: LAB | Facility: HOSPITAL | Age: 59
End: 2024-06-07
Attending: INTERNAL MEDICINE
Payer: COMMERCIAL

## 2024-06-07 DIAGNOSIS — E03.9 MYXEDEMA HEART DISEASE: Primary | ICD-10-CM

## 2024-06-07 DIAGNOSIS — I51.9 MYXEDEMA HEART DISEASE: Primary | ICD-10-CM

## 2024-06-07 LAB
T4 FREE SERPL-MCNC: 1.1 NG/DL (ref 0.8–1.7)
TSI SER-ACNC: 1.28 MIU/ML (ref 0.55–4.78)

## 2024-06-07 PROCEDURE — 84443 ASSAY THYROID STIM HORMONE: CPT

## 2024-06-07 PROCEDURE — 84439 ASSAY OF FREE THYROXINE: CPT

## 2024-06-07 PROCEDURE — 36415 COLL VENOUS BLD VENIPUNCTURE: CPT

## 2024-07-02 ENCOUNTER — APPOINTMENT (OUTPATIENT)
Dept: CARDIOLOGY | Age: 59
End: 2024-07-02

## 2024-07-02 VITALS
HEART RATE: 63 BPM | DIASTOLIC BLOOD PRESSURE: 67 MMHG | BODY MASS INDEX: 34.8 KG/M2 | HEIGHT: 63 IN | SYSTOLIC BLOOD PRESSURE: 100 MMHG | WEIGHT: 196.43 LBS | OXYGEN SATURATION: 98 %

## 2024-07-02 DIAGNOSIS — E78.2 MIXED HYPERLIPIDEMIA: Primary | ICD-10-CM

## 2024-07-02 DIAGNOSIS — Z86.79 H/O CARDIOMYOPATHY: ICD-10-CM

## 2024-07-02 DIAGNOSIS — I49.3 PVC'S (PREMATURE VENTRICULAR CONTRACTIONS): ICD-10-CM

## 2024-07-02 DIAGNOSIS — E03.9 HYPOTHYROIDISM, UNSPECIFIED TYPE: ICD-10-CM

## 2024-07-02 RX ORDER — CARVEDILOL 12.5 MG/1
12.5 TABLET ORAL EVERY 12 HOURS
Qty: 180 TABLET | Refills: 1 | Status: SHIPPED | OUTPATIENT
Start: 2024-07-02

## 2024-07-02 RX ORDER — ATORVASTATIN CALCIUM 20 MG/1
20 TABLET, FILM COATED ORAL NIGHTLY
Qty: 90 TABLET | Refills: 1 | Status: SHIPPED | OUTPATIENT
Start: 2024-07-02

## 2024-07-02 SDOH — HEALTH STABILITY: PHYSICAL HEALTH: ON AVERAGE, HOW MANY DAYS PER WEEK DO YOU ENGAGE IN MODERATE TO STRENUOUS EXERCISE (LIKE A BRISK WALK)?: 3 DAYS

## 2024-07-02 SDOH — HEALTH STABILITY: PHYSICAL HEALTH: ON AVERAGE, HOW MANY MINUTES DO YOU ENGAGE IN EXERCISE AT THIS LEVEL?: 60 MIN

## 2024-07-02 ASSESSMENT — PATIENT HEALTH QUESTIONNAIRE - PHQ9
2. FEELING DOWN, DEPRESSED OR HOPELESS: NOT AT ALL
SUM OF ALL RESPONSES TO PHQ9 QUESTIONS 1 AND 2: 0
CLINICAL INTERPRETATION OF PHQ2 SCORE: NO FURTHER SCREENING NEEDED
1. LITTLE INTEREST OR PLEASURE IN DOING THINGS: NOT AT ALL
SUM OF ALL RESPONSES TO PHQ9 QUESTIONS 1 AND 2: 0

## 2024-08-16 ENCOUNTER — OFFICE VISIT (OUTPATIENT)
Dept: NEUROLOGY | Facility: CLINIC | Age: 59
End: 2024-08-16
Payer: COMMERCIAL

## 2024-08-16 VITALS — BODY MASS INDEX: 32.78 KG/M2 | WEIGHT: 185 LBS | HEIGHT: 63 IN

## 2024-08-16 DIAGNOSIS — R42 VERTIGO: Primary | ICD-10-CM

## 2024-08-16 RX ORDER — ATORVASTATIN CALCIUM 20 MG/1
20 TABLET, FILM COATED ORAL NIGHTLY
COMMUNITY
Start: 2024-07-02

## 2024-08-16 RX ORDER — CARVEDILOL 12.5 MG/1
12.5 TABLET ORAL 2 TIMES DAILY WITH MEALS
COMMUNITY

## 2024-08-16 NOTE — PROGRESS NOTES
[facility]  [address], [address]  [facility], [place] [id number] [phone]          [facility]  NEW PATIENT EVALUATION  Reason for Admission/Consultation:  possible BPPV    Requested by: Caro Jeffries MD  [address]  [address]  [place] [address]    PCP: Caro Jeffries MD  Chief Complaint: Hospital F/U (Pt was seen in hospital on 4/29/24 for benign paroxysmal positional vertigo. Pt states she had a MRA completed and saw ENT provider. Pt states that  she is getting better, but still feel the vertigo when she goes to bed at night. )      HPI:  Danni Cope is a 59 year old  female w/ a pmhx of cardiomyopathy, left leg DVT, hyperlipidemia, insulin resistance, obesity, cervical radiculitis, diarrhea, rectal bleeding, multiple lipomas, BPPV, who presents for  possible BPPV vs central cause of vertigo.  She has been seen by ENT and by vestibular therapy at [place].  Initially the diagnosis was BPPV while she was in the hospital.  However, both ENT (Dr. Virgen) and her physical therapist do not think that she has a peripheral vestibulopathy.  She is here for further workup to determine etiology of her vertigo.    She was admitted to [facility] at the end of April until the beginning of May for sudden onset severe vertigo that was persistent.  Does not endorse a classic history of BPPV.  In April the vertigo was constant.  She woke up at 4 am  She could not focus, she could not see. She had to crawl to the bathroom. She drank some gatorade. It lasted for an hour.  No loss of hearing.  She  could not open her eyes; any light lead to photophobia.  She was nauseated.  Did not vomit.  Might have had a headache; not certain. It was more the dizziness that bothered her. Unclear she had a headache; may have been normal.  Felt like the world was moving around her abnormally.  No tinnitus.  Asked about oscillopsia  She could not really open her  eyes;    GONSALEZ never affected her productivity.     She has 4 to 5 TT HA a month.  Became carsick easily as a child.      Her biological mom had multiple sclerosis.       She saw a vestibular therapist who did not see geotorsional nystagmus on exam.  She saw ENT who did not think she had a peripheral vestibulopathy.    She still has seconds long episodes of vertigo when she lays supine.        Review and summation of prior records      ROS:  Pertinent positive and negatives per HPI.  All others were reviewed and negative.    Past Medical History:    Cardiomyopathy (HCC)    Hyperlipidemia    Left leg DVT (HCC)    Obesity (BMI 30-39.9)    Visual impairment         Current Outpatient Medications:     carvedilol 12.5 MG Oral Tab, Take 1 tablet (12.5 mg total) by mouth 2 (two) times daily with meals., Disp: , Rfl:     atorvastatin 20 MG Oral Tab, Take 1 tablet (20 mg total) by mouth nightly., Disp: , Rfl:     levothyroxine 50 MCG Oral Tab, Take 1 tablet (50 mcg total) by mouth daily., Disp: , Rfl:     apixaban (ELIQUIS) 2.5 MG Oral Tab, Take 1 tablet (2.5 mg total) by mouth 2 (two) times daily., Disp: 60 tablet, Rfl: 11    lisinopril 5 MG Oral Tab, Take 1 tablet (5 mg total) by mouth daily., Disp: , Rfl: 2    meclizine 25 MG Oral Tab, 1 tablet po tid for 24 hrs then as needed for dizziness (Patient not taking: Reported on 8/16/2024), Disp: 15 tablet, Rfl: 0    carvedilol 25 MG Oral Tab, Take 1 tablet (25 mg total) by mouth 2 (two) times daily with meals., Disp: , Rfl:     Allergies   Allergen Reactions    Pcns [Penicillins] HIVES    Strawberries Tightness in Throat    [place] Flavor SWELLING       Past Surgical History:   Procedure Laterality Date    Appendectomy      Cholecystectomy      Hernia surgery      Hysterectomy  2004    Other surgical history  2013    cardiac ablation    Skin surgery      lipoma wrist and thigh    Total abdom hysterectomy  2011    no associated bleeeding complications    [place] teeth removed          Family History   Adopted: Yes   Problem Relation Age of Onset    Cancer Father         lung; tobacco user       Social history:  History   Smoking Status    Never   Smokeless Tobacco    Never     History   Alcohol Use    Yes     Comment: social     History   Drug Use Unknown       Family History   Adopted: Yes   Problem Relation Age of Onset    Cancer Father         lung; tobacco user       Objective:  Vitals  Height 63\", weight 185 lb (83.9 kg), last menstrual period 04/29/2005, not currently breastfeeding.  Ht 63\"   Wt 185 lb (83.9 kg)   LMP 04/29/2005   BMI 32.77 kg/m²   Body mass index is 32.77 kg/m².  There were no vitals filed for this visit.     Exam:  - General: appears stated age and no distress     - Pulmonary: CTAB. No signs of respiratory distress.    Neurologic Exam  - Mental Status: Alert and attentive.  Oriented to person place time purpose of visit.  She can provide a history..  Speech is spontaneous, fluent, and prosodic. Comprehension and repetition intact. Phrase length and rate are normal. No paraphasic errors, neologisms, anomia, acalculia, apraxia, anosognosia, or R/L confusion.   - Cranial Nerves: No gaze preference. Visual fields:normal.  Pupils are equally round and reactive to light  in a well lit room. EOMI. No nystagmus. No ptosis. V1 to V3 intact to LT and temperature in all 3 branches. No pathological facial asymmetry. No flattening of the nasolabial fold. .  Hearing grossly intact.  Tongue midline. No atrophy or fasiculations of the tongue noted. Palate and uvula elevate symmetrically.  Shoulder shrug symmetric.  - Fundoscopic exam:normal w/o hemorrhages, exudates, or papilledema.No attenuation. No pallor.  - Motor:  normal tone, normal bulk. No interosseous wasting. No flattening of hypothenar eminences.       Right Left     Motor Strength   Deltoids 5 5  Triceps 5 5  Biceps 5 5  Wrist Extensors 5 5   5 5   Hip Flexors 5 5   Knee extensors 5 5  Knee flexors 5 5  Plantar  flexion 5 5  Dorsiflexion 5 5      Pronator drift: No pronator drift   Arm Rolling: No orbiting.   Finger Taps: Finger taps are symmetric in rate and amplitude.    Rapid movements: Rapid/fine movements are symmetric. As expected their dominant hand is slightly faster.   Foot Taps: Foot taps are symmetric.      Asterixis: No asterixis noted.   Tremor:       Reflexes:    C5 C6 C7  L4 S1   R 2+ 2+  2+ 2+   L 2+ 2+  2+ 2+   Adductor Spread: No adductor spread noted.    Frontal release signs:Not assessed.    Jaw Jerk:    Elvin's sign:absent   Nonsustained clonus: Absent   Sustained clonus: Absent   - Sensory:   Light touch: normal  Temperature:normal  Pinprick: normal  Vibration: normal  Proprioception:      Sensory level:      Romberg:   - Cerebellum: No truncal ataxia. No titubations. No dysmetria, no dysdiadochokinesis. No overshoot.   - Gait/station: Normal gait and station. Symmetric arm swing.   - Toe walking:  - Heel walking:  - Plantar response: flexor bilaterally    Data reviewed    Test results/Imaging:   Lab Results   Component Value Date    TSH 1.282 06/07/2024     Lab Results   Component Value Date    HDL 57 11/18/2023     (H) 11/18/2023    TRIG 146 11/18/2023     Lab Results   Component Value Date    HGB 11.9 (L) 04/30/2024    HCT 38.2 04/30/2024    MCV 93.4 04/30/2024    WBC 3.9 (L) 04/30/2024    .0 04/30/2024      Lab Results   Component Value Date    GLUCOSE 81 02/12/2014    BUN 14 04/30/2024    CA 9.2 04/30/2024    ALT 20 04/30/2024    AST 12 (L) 04/30/2024    ALB 3.0 (L) 04/30/2024     04/30/2024    K 3.8 04/30/2024     (H) 04/30/2024    CO2 25.0 04/30/2024      I have reviewed labs.    Performed an independent visualization of: MRI of the brain, MRA of the head,  Imaging revealed: Agree with radiology read.           Danni Cope is a 59 year old  female w/ a pmhx of cardiomyopathy, left leg DVT, hyperlipidemia, insulin resistance, obesity, cervical radiculitis,  diarrhea, rectal bleeding, multiple lipomas, BPPV, who presents for  possible BPPV vs central cause of vertigo.  She has been seen by ENT and by vestibular therapy at [place].  Initially the diagnosis was BPPV while she was in the hospital.  However, both ENT (Dr. Virgen) and her physical therapist do not think that she has a peripheral vestibulopathy.  She is here for further workup to determine etiology of her vertigo.    This could be BPPV but in her prior exams may have occurred when she was not symptomatic, however duration of her symptoms would argue against BPPV.  Need to exclude other causes such as a schwannoma therefore MRI of the brain IAC with and without contrast.  Follow-up after imaging completed.  Return to vestibular therapy if symptoms worsen.  If she has sudden onset disabling symptoms she should call 911.  Otherwise vestibular migraine is on the differential.    1. Vertigo  - MRI BRAIN/IAC (ALL W+WO CNTRST) (CPT=70553); Future      No orders of the defined types were placed in this encounter.          Education/Instructions given to: patient   Barriers to Learning:None  Content: Refer to note above. Evaluation/Outcome: Verbalized understanding    This document is not intended to support charting by exception.  Sections left blank in a completed note should be presumed not to have been done.    Education and counseling provided to patient. Instructed patient to call my office or seek medical attention immediately if symptoms worsen.  All questions were answered. All side effects of drugs were discussed.     Today, I personally spent 70 minutes reviewing the prior notes, reviewing any relevant and available imaging, and in direct face to face time with the patient, of which greater than 50% of the time was spent in patient education, counseling, and coordination of care as described above.   Issues discussed: Diagnosis and implications on future health, benefits and side effects of present and  future medications, test results as well as further testing and medications required.    Return to clinic in: Return in about 2 months (around 10/16/2024).    Jozef Ritter DO  [facility]   08/16/24  12:30 PM

## 2024-08-21 ENCOUNTER — HOSPITAL ENCOUNTER (OUTPATIENT)
Dept: MAMMOGRAPHY | Age: 59
Discharge: HOME | End: 2024-08-21
Attending: INTERNAL MEDICINE
Payer: COMMERCIAL

## 2024-08-21 DIAGNOSIS — Z12.31 ENCOUNTER FOR SCREENING MAMMOGRAM FOR MALIGNANT NEOPLASM OF BREAST: ICD-10-CM

## 2024-08-21 PROCEDURE — 77063 BREAST TOMOSYNTHESIS BI: CPT | Performed by: INTERNAL MEDICINE

## 2024-08-21 PROCEDURE — 77067 SCR MAMMO BI INCL CAD: CPT | Performed by: INTERNAL MEDICINE

## 2024-10-25 DIAGNOSIS — Z86.718 HISTORY OF DVT (DEEP VEIN THROMBOSIS): ICD-10-CM

## 2024-10-25 DIAGNOSIS — Z79.01 CURRENT USE OF ANTICOAGULANT THERAPY: ICD-10-CM

## 2024-10-31 RX ORDER — LISINOPRIL 5 MG/1
5 TABLET ORAL DAILY
Qty: 90 TABLET | Refills: 1 | Status: SHIPPED | OUTPATIENT
Start: 2024-10-31

## 2024-11-09 ENCOUNTER — LAB ENCOUNTER (OUTPATIENT)
Dept: LAB | Facility: HOSPITAL | Age: 59
End: 2024-11-09
Attending: INTERNAL MEDICINE
Payer: COMMERCIAL

## 2024-11-09 DIAGNOSIS — Z13.220 SCREENING FOR LIPOID DISORDERS: Primary | ICD-10-CM

## 2024-11-09 DIAGNOSIS — Z13.29 SCREENING FOR THYROID DISORDER: ICD-10-CM

## 2024-11-09 LAB
ALBUMIN SERPL-MCNC: 4.4 G/DL (ref 3.2–4.8)
ALBUMIN/GLOB SERPL: 1.6 {RATIO} (ref 1–2)
ALP LIVER SERPL-CCNC: 95 U/L
ALT SERPL-CCNC: 15 U/L
ANION GAP SERPL CALC-SCNC: 7 MMOL/L (ref 0–18)
AST SERPL-CCNC: 18 U/L (ref ?–34)
BILIRUB SERPL-MCNC: 0.9 MG/DL (ref 0.2–1.1)
BUN BLD-MCNC: 16 MG/DL (ref 9–23)
BUN/CREAT SERPL: 19 (ref 10–20)
CALCIUM BLD-MCNC: 10.4 MG/DL (ref 8.7–10.4)
CHLORIDE SERPL-SCNC: 107 MMOL/L (ref 98–112)
CHOLEST SERPL-MCNC: 189 MG/DL (ref ?–200)
CO2 SERPL-SCNC: 26 MMOL/L (ref 21–32)
CREAT BLD-MCNC: 0.84 MG/DL
EGFRCR SERPLBLD CKD-EPI 2021: 80 ML/MIN/1.73M2 (ref 60–?)
EST. AVERAGE GLUCOSE BLD GHB EST-MCNC: 114 MG/DL (ref 68–126)
FASTING PATIENT LIPID ANSWER: YES
FASTING STATUS PATIENT QL REPORTED: YES
GLOBULIN PLAS-MCNC: 2.7 G/DL (ref 2–3.5)
GLUCOSE BLD-MCNC: 98 MG/DL (ref 70–99)
HBA1C MFR BLD: 5.6 % (ref ?–5.7)
HDLC SERPL-MCNC: 54 MG/DL (ref 40–59)
LDLC SERPL CALC-MCNC: 114 MG/DL (ref ?–100)
NONHDLC SERPL-MCNC: 135 MG/DL (ref ?–130)
OSMOLALITY SERPL CALC.SUM OF ELEC: 291 MOSM/KG (ref 275–295)
POTASSIUM SERPL-SCNC: 4.7 MMOL/L (ref 3.5–5.1)
PROT SERPL-MCNC: 7.1 G/DL (ref 5.7–8.2)
SODIUM SERPL-SCNC: 140 MMOL/L (ref 136–145)
TRIGL SERPL-MCNC: 118 MG/DL (ref 30–149)
TSI SER-ACNC: 2.17 UIU/ML (ref 0.55–4.78)
VLDLC SERPL CALC-MCNC: 20 MG/DL (ref 0–30)

## 2024-11-09 PROCEDURE — 36415 COLL VENOUS BLD VENIPUNCTURE: CPT

## 2024-11-09 PROCEDURE — 80061 LIPID PANEL: CPT

## 2024-11-09 PROCEDURE — 83036 HEMOGLOBIN GLYCOSYLATED A1C: CPT

## 2024-11-09 PROCEDURE — 84443 ASSAY THYROID STIM HORMONE: CPT

## 2024-11-09 PROCEDURE — 80053 COMPREHEN METABOLIC PANEL: CPT

## 2024-11-15 ENCOUNTER — OFFICE VISIT (OUTPATIENT)
Dept: HEMATOLOGY/ONCOLOGY | Facility: HOSPITAL | Age: 59
End: 2024-11-15
Attending: INTERNAL MEDICINE
Payer: COMMERCIAL

## 2024-11-15 VITALS
OXYGEN SATURATION: 98 % | BODY MASS INDEX: 34.97 KG/M2 | RESPIRATION RATE: 16 BRPM | DIASTOLIC BLOOD PRESSURE: 66 MMHG | WEIGHT: 197.38 LBS | HEIGHT: 63 IN | HEART RATE: 63 BPM | TEMPERATURE: 98 F | SYSTOLIC BLOOD PRESSURE: 112 MMHG

## 2024-11-15 DIAGNOSIS — Z79.01 CURRENT USE OF ANTICOAGULANT THERAPY: ICD-10-CM

## 2024-11-15 DIAGNOSIS — Z86.718 HISTORY OF DVT (DEEP VEIN THROMBOSIS): ICD-10-CM

## 2024-11-15 PROCEDURE — 99214 OFFICE O/P EST MOD 30 MIN: CPT | Performed by: INTERNAL MEDICINE

## 2024-11-15 NOTE — PROGRESS NOTES
Hematology Progress Note    Patient Name: Danni Cope   YOB: 1965   Medical Record Number: [id number]   CSN: [id number]   Consulting Physician: Alex Churchill MD  Referring Physician(s): No ref. provider found  Date of Visit: 11/15/2024     Chief Complaint:  History of DVT of the left leg--unprovoked    History of Present Illness:     Danni Cope is a 59 year old female that was seen today in the hematology suite for evaluation of the above condition.  Patient has prior history of cardiomyopathy but her recent ejection fraction is 55 to 60% as of March, 2021; otherwise no contributing PMH.  Patient reports development of left leg pain and erythema that initiated in her calf after returning from family vacation to [place] in [place]. Her trip to [place] was a brief 2.5 hr flight and the patient was ambulating regularly at the [facility] before and after the flight. Danni ambulates regularly for work walking about 4 miles/daily, so no recent immobilization.  Her left leg pain continued and extended above the knee into the groin area when she notified her PCP who recommended she undergo ultrasound venous Doppler imaging 11/12/2021. Her ultrasound showed thrombus throughout the entire left greater saphenous vein of the calf and thigh which extends into the common vein proximally where there is deep vein thrombosis noted.  This was a nonocclusive thrombus of the common femoral vein. Patient denied any associated chest pain, dyspnea, hemoptysis, or pain with inspiration to suggest PE involvement.  Her baseline CBC+differential data was completely within normal limits as well as baseline PTT, PT/INR values as well as kidney & liver function tests were also normal prior to initiation of anticoagulation with apixaban. A baseline d-dimer value was not checked. Patient reports being in her usual state of health over the last 6 months to 1 year.  She denies any systemic signs of illness.  She denies any  bleeding or bruising complications or financial toxicity associated with apixaban.  Her ROS is otherwise negative.    Interval History:  Patient returns for f/u based on hx of DVT on long term anticoagulation with Apixaban. She denies hx of bleeding or bruising complications. No report of intracranial bleeding, regular falls, or GI bleeding. Denies symptoms of DVT/PE recurrent. No new concerns on ROS today.      Past Medical History:  Past Medical History:    Cardiomyopathy (HCC)    Hyperlipidemia    Left leg DVT (HCC)    Obesity (BMI 30-39.9)    Visual impairment       Past Surgical History:  Past Surgical History:   Procedure Laterality Date    Appendectomy      Cholecystectomy      Hernia surgery      Hysterectomy  2004    Other surgical history  2013    cardiac ablation    Skin surgery      lipoma wrist and thigh    Total abdom hysterectomy  2011    no associated bleeeding complications    [place]iban teeth removed         Family Medical History:  Family History   Adopted: Yes   Problem Relation Age of Onset    Cancer Father         lung; tobacco user       Social History:  Social History     Socioeconomic History    Marital status:      Spouse name: Not on file    Number of children: Not on file    Years of education: Not on file    Highest education level: Not on file   Occupational History    Not on file   Tobacco Use    Smoking status: Never    Smokeless tobacco: Never   Vaping Use    Vaping status: Never Used   Substance and Sexual Activity    Alcohol use: Yes     Comment: social    Drug use: Never    Sexual activity: Not on file   Other Topics Concern    Not on file   Social History Narrative    Danni is . She has 2 adult daughters. 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. She lives alone in [place], [place]     Social Drivers of Health     Financial Resource Strain: Not on file   Food Insecurity: No Food Insecurity (4/29/2024)    Food Insecurity     Food Insecurity: Never true    Transportation Needs: No Transportation Needs (4/29/2024)    Transportation Needs     Lack of Transportation: No   Physical Activity: Low Risk  (7/2/2024)    [facility] from [facility]    Exercise Vital Sign     On average, how many days per week do you engage in moderate to strenuous exercise (like a brisk walk)?: 3 days     On average, how many minutes do you engage in exercise at this level?: 60 min   Stress: Not on file   Social Connections: Not on file   Housing Stability: Low Risk  (4/29/2024)    Housing Stability     Housing Instability: No     Housing Instability Emergency: Not on file     Crib or Bassinette: Not on file       Allergies:   Allergies   Allergen Reactions    Pcns [Penicillins] HIVES    Strawberries Tightness in Throat    [place] Flavor SWELLING       Current Medications:   apixaban (ELIQUIS) 2.5 MG Oral Tab Take 1 tablet (2.5 mg total) by mouth 2 (two) times daily. 180 tablet 3    carvedilol 12.5 MG Oral Tab Take 1 tablet (12.5 mg total) by mouth 2 (two) times daily with meals.      atorvastatin 20 MG Oral Tab Take 1 tablet (20 mg total) by mouth nightly.      levothyroxine 50 MCG Oral Tab Take 1 tablet (50 mcg total) by mouth daily.      lisinopril 5 MG Oral Tab Take 1 tablet (5 mg total) by mouth daily.  2       Review of Systems:    Constitutional: Negative for anorexia, chills, fatigue, fevers, night sweats and weight loss.  Eyes: Negative for visual disturbance, irritation and redness.  Respiratory: Negative for cough, hemoptysis, chest pain, or dyspnea on exertion.  Gastrointestinal: Negative for dysphagia, odynophagia, reflux symptoms, nausea, vomiting, change in bowel habits, diarrhea, constipation and abdominal pain.  Integument/breast: Negative for rash, skin lesions, and pruritus.  Hematologic/lymphatic: Negative for easy bruising, bleeding, and lymphadenopathy.  Musculoskeletal: Negative for myalgias, arthralgias, muscle weakness.  Neurological: Negative for  headaches, dizziness, speech problems, gait problems and weakness.    A comprehensive 14 point review of systems was completed.  Pertinent positives and negatives noted in the the HPI.     Vital Signs:  /66 (BP Location: Left arm, Patient Position: Sitting, Cuff Size: large)   Pulse 63   Temp 98 °F (36.7 °C) (Oral)   Resp 16   Ht 1.6 m (5' 3\")   Wt 89.5 kg (197 lb 6.4 oz)   LMP 04/29/2005   SpO2 98%   BMI 34.97 kg/m²     Physical Examination:    General: Patient is alert and oriented x 3, not in acute distress.  Psych: Friendly, cooperative with appropriate questions and responses  HEENT: EOMs intact. Oropharynx is clear.   Neck:  No palpable lymphadenopathy. Neck is supple.  Lymphatics: There is no palpable lymphadenopathy throughout in the cervical, supraclavicular, axillary, or inguinal regions.  Chest: Clear to auscultation. No wheezes or rales.  Heart: Regular rate and rhythm. S1S2 normal.  Abdomen: Soft, non tender with good bowel sounds.  No hepatosplenomegaly.  No palpable mass.  Extremities: No edema or calf tenderness. No signs of erythema   Neurological: Grossly intact.      Labs:    Lab Results   Component Value Date/Time    WBC 3.9 (L) 04/30/2024 04:54 AM    RBC 4.09 04/30/2024 04:54 AM    HGB 11.9 (L) 04/30/2024 04:54 AM    HCT 38.2 04/30/2024 04:54 AM    MCV 93.4 04/30/2024 04:54 AM    MCH 29.1 04/30/2024 04:54 AM    MCHC 31.2 04/30/2024 04:54 AM    RDW 12.9 04/30/2024 04:54 AM    NEPRELIM 1.84 04/30/2024 04:54 AM    .0 04/30/2024 04:54 AM       Lab Results   Component Value Date/Time    GLU 98 11/09/2024 08:16 AM    BUN 16 11/09/2024 08:16 AM    CREATSERUM 0.84 11/09/2024 08:16 AM    GFRNAA 74 11/12/2021 03:01 PM    CA 10.4 11/09/2024 08:16 AM    ALB 4.4 11/09/2024 08:16 AM     11/09/2024 08:16 AM    K 4.7 11/09/2024 08:16 AM     11/09/2024 08:16 AM    CO2 26.0 11/09/2024 08:16 AM    ALKPHO 95 11/09/2024 08:16 AM    AST 18 11/09/2024 08:16 AM    ALT 15 11/09/2024  08:16 AM           Impression:      ICD-10-CM    1. History of DVT (deep vein thrombosis)  Z86.718 apixaban (ELIQUIS) 2.5 MG Oral Tab      2. Current use of anticoagulant therapy  Z79.01 apixaban (ELIQUIS) 2.5 MG Oral Tab          59 year old W with no contributing PMH presents for evaluation based on history of DVT of left lower leg with femoral vein involvement    Plan:    1.) History of Left Leg DVT--unprovoked; SVT that extended into a DVT involving the femoral vein in 11/22021    --I discussed with the patient that femoral vein clot involvement has a potential risk to spread to the lungs and cause pulmonary embolism as this was an above-the-knee DVT. However, favorably she had a non occlusive thrombus and no PE symptoms    --she reports resolution of left leg pain and erythema symptoms noted at the time of her clot    --I discussed with the patient guideline recommendations suggest against testing for hereditary thrombophilia in persons greater than age 50 with first time episode of VTE.  She has no prior history of DVT or PE. Her family history is unknown to her as she is adopted    --I reviewed with the patient causes for DVT; none of which appear present for her, making this an unprovoked clot    --Patient is tolerating prophylactic anticoagulation well with Apixaban 2.5 mg BID    --follow-up in 1 year    --She is up-to-date with age appropriate cancer screening recommendations    --I informed Danni her risks for subsequent clots in the first year and subsequent 5 years with an unexplained clot are concerning so indefinite anticoagulation would be in her best interest to prevent a potentially fatal PE    2.) Current Use of Anticoagulation    --avoid Asprin and NSAIDs due to long term use of anticoagulation to prevent bleeding complications      MDM: Moderate  Risk    Alex Churchill MD  [facility]  [address]. [facility], [place] [address]

## 2024-11-19 ENCOUNTER — APPOINTMENT (OUTPATIENT)
Dept: CARDIOLOGY | Age: 59
End: 2024-11-19

## 2024-12-04 NOTE — TELEPHONE ENCOUNTER
Incoming call from MARCUS Frazier Asking a few follow up questions regarding referral. Chart reviewed and case discussed. Also, requesting additional phone numbers on file for mom as she has not been able to get a hold of her. Two numbers listed in Epic provided to Candy and name and number for [employer] [facility] following provided for any follow up needs.    Results and RTW guidelines:    COVID RESULT:    [x] Viewed by employee in [address] RTW plan and instructions as indicated on triage call. Manager notified. Estimated RTW date: 01/26/2022  [] Discussed with employee   [] Unable to reach by phone.   Sent

## 2024-12-24 RX ORDER — ATORVASTATIN CALCIUM 20 MG/1
20 TABLET, FILM COATED ORAL NIGHTLY
Qty: 90 TABLET | Refills: 1 | Status: SHIPPED | OUTPATIENT
Start: 2024-12-24

## 2024-12-24 RX ORDER — CARVEDILOL 12.5 MG/1
12.5 TABLET ORAL EVERY 12 HOURS
Qty: 180 TABLET | Refills: 1 | Status: SHIPPED | OUTPATIENT
Start: 2024-12-24

## 2025-01-24 ENCOUNTER — HOSPITAL ENCOUNTER (OUTPATIENT)
Dept: MRI IMAGING | Facility: HOSPITAL | Age: 60
Discharge: HOME | End: 2025-01-24
Attending: Other
Payer: COMMERCIAL

## 2025-01-24 DIAGNOSIS — R42 VERTIGO: ICD-10-CM

## 2025-01-24 PROCEDURE — A9575 INJ GADOTERATE MEGLUMI 0.1ML: HCPCS | Performed by: OTHER

## 2025-01-24 PROCEDURE — 70553 MRI BRAIN STEM W/O & W/DYE: CPT | Performed by: OTHER

## 2025-01-24 RX ORDER — GADOTERATE MEGLUMINE 376.9 MG/ML
20 INJECTION INTRAVENOUS
Status: COMPLETED | OUTPATIENT
Start: 2025-01-24 | End: 2025-01-24

## 2025-01-24 RX ADMIN — GADOTERATE MEGLUMINE 18 ML: 376.9 INJECTION INTRAVENOUS at 15:45:00

## 2025-02-20 ENCOUNTER — OFFICE VISIT (OUTPATIENT)
Dept: NEUROLOGY | Facility: CLINIC | Age: 60
End: 2025-02-20
Payer: COMMERCIAL

## 2025-02-20 DIAGNOSIS — R42 VERTIGO: Primary | ICD-10-CM

## 2025-02-20 PROBLEM — E78.5 HYPERLIPIDEMIA: Status: ACTIVE | Noted: 2022-09-21

## 2025-02-20 PROBLEM — I42.0 DILATED CARDIOMYOPATHY (HCC): Status: ACTIVE | Noted: 2022-09-21

## 2025-02-20 PROBLEM — I82.402 DEEP VEIN THROMBOSIS (DVT) OF LEFT LOWER EXTREMITY (HCC): Status: ACTIVE | Noted: 2022-09-21

## 2025-02-20 PROBLEM — I49.3 PVC'S (PREMATURE VENTRICULAR CONTRACTIONS): Status: ACTIVE | Noted: 2022-09-21

## 2025-02-20 PROCEDURE — 99214 OFFICE O/P EST MOD 30 MIN: CPT | Performed by: OTHER

## 2025-02-20 RX ORDER — CARVEDILOL 12.5 MG/1
TABLET ORAL
COMMUNITY
Start: 2024-07-15

## 2025-02-20 NOTE — PROGRESS NOTES
[facility] [address], [address]  [facility] [address]  [phone]        Neurology Clinic Follow Up Note    Chief Complaint:  Vertigo (LOV 8/16/2024 Seen for Vertigo. /Patient here today 6 month follow up for test orders/results of MRI of the Brain and medication management meclizine 25 MG. Denies numbness, hot/cold sensation, tingling or nausea. /)      HPI:    Danni Cope is a 59 year old  female w/ a pmhx of cardiomyopathy, left leg DVT, hyperlipidemia, insulin resistance, obesity, cervical radiculitis, diarrhea, rectal bleeding, multiple lipomas, BPPV, who presents  in follow up for possible BPPV vs central cause of vertigo.  She has been seen by ENT and by vestibular therapy at [facility].  Initially the diagnosis was BPPV while she was in the hospital.  However, both ENT (Dr. Virgen) and her physical therapist do not think that she has a peripheral vestibulopathy.  She is here for further workup to determine etiology of her vertigo.    She has a hx of  4 to 5 headaches a month.  Became carsick easily as a child. She completed vestibular therapy at [facility].     This could be BPPV but in her prior exams may have occurred when she was not symptomatic, however duration of her symptoms would argue against BPPV.  Need to exclude other causes such as a schwannoma therefore MRI of the brain IAC with and without contrast.  Follow-up after imaging completed.  Return to vestibular therapy if symptoms worsen.     02/19/25 Interval History/Subjective :   Here in follow up to review mri brain iac/  We reviewed the images.  She will still get vertigo intermittently when she lays down.  Became really dizzy when she was driving home in the snow.  Lasted for 1 to 2 minutes.      ROS: Pertinent positive and negatives per HPI.  All others were reviewed and negative.     Medications:  Current Outpatient Medications   Medication Instructions    apixaban (ELIQUIS) 2.5 mg, Oral, 2 times daily     atorvastatin (LIPITOR) 20 mg, Nightly    carvedilol (COREG) 12.5 mg, 2 times daily with meals    levothyroxine (SYNTHROID) 50 mcg, Daily    lisinopril (PRINIVIL; ZESTRIL) 5 mg, Daily    meclizine 25 MG Oral Tab 1 tablet po tid for 24 hrs then as needed for dizziness        Reviewed and assessed      Objective:  Last vitals and weight :  There is no height or weight on file to calculate BMI.   There were no vitals filed for this visit.   Last menstrual period 04/29/2005, not currently breastfeeding.  LMP 04/29/2005   Exam:  - General: appears stated age and no distress     - Pulmonary: Normal excursion of the chest.  No signs of respiratory distress.  Neurologic Exam  - Mental Status: Alert and attentive. .  Speech is spontaneous, fluent, and prosodic. Comprehension and repetition intact. Phrase length and rate are normal. No paraphasic errors, neologisms, anomia, acalculia, apraxia, anosognosia, or R/L confusion.   - Cranial Nerves: No gaze preference. Visual fields:normal  Pupils are 4mm briskly constricting to 3mm and equally round and reactive to light  in a well lit room. No rAPD. EOMI. No nystagmus. No ptosis. V1-V3 intact B/L to light touch.No pathological facial asymmetry. No flattening of the nasolabial fold. .  Hearing grossly intact.  Tongue midline. No atrophy or fasiculations of the tongue noted. Palate and uvula elevate symmetrically.  Shoulder shrug symmetric.     - Motor:  normal tone/bulk. No interosseous wasting. No flattening of hypothenar eminences.   Motor Strength    Pronator drift: No pronator drift   Arm Rolling: No orbiting.   Finger Taps: Finger taps are symmetric in rate and amplitude.    Rapid movements: symmetric. No fatiguing.   Right Left     Motor Strength   Deltoids 5 5  Triceps 5 5  Biceps 5 5   5 5   Hip Flexors 5 5   Knee extensors 5 5  Knee flexors 5 5      Foot Taps:    Asterixis: No asterixis noted.   Tremor:       Reflexes:    C5 C6 C7  L4 S1   R 2+ 2+  2+ 2+   L 2+ 2+  2+  2+    Frontal release signs:Not assessed.    Jaw Jerk:    Elvin's sign:absent   Nonsustained clonus: Absent   Sustained clonus: Absent   - Sensory:   Light touch: normal     - Cerebellum: No truncal ataxia. No titubations. No dysmetria, no dysdiadochokinesis. No rebound.           Most recent lab results:     No results found for this or any previous visit (from the past 36 hours).  Reviewed and assessed    Diagnostic studies:  Performed an independent visualization of  mri brain and iac  Imaging revealed:   agree w read     Assessment      May be mild case of vestibular migraine. She can use her HEP if she has persistent symptoms.      Plan   1. Vertigo                This document is not intended to support charting by exception.  Sections left blank in a completed note should be presumed not to have been done.      Disclaimer:   This record was dictated using  Dragon software. There may be errors due to voice recognition problems that were not realized and corrected during the completion of the note.       Total time spent involved in the care of the patient including time spent writing the note, reviewing the labs, reviewing the relevant imaging, and face to face time was 30 minutes, more than 50% of the time was spent in counseling and/or coordination of care related to  vertigo.        Thank you for allowing me to participate in the care of your patient.    Jozef Ritter,

## 2025-04-02 ENCOUNTER — TELEPHONE (OUTPATIENT)
Dept: NEUROLOGY | Facility: CLINIC | Age: 60
End: 2025-04-02

## 2025-04-02 NOTE — TELEPHONE ENCOUNTER
Pt brought in FMLA paperwork for provider to fill out along with her 2024 fmla for dr to compare in doing the one for 2025. Forwarded on via inner office and email.

## 2025-04-07 ENCOUNTER — TELEPHONE (OUTPATIENT)
Dept: NEUROLOGY | Facility: CLINIC | Age: 60
End: 2025-04-07

## 2025-04-07 NOTE — TELEPHONE ENCOUNTER
Disability/RTW received in Forms Dept, logged for processing.  MyChart sent for Release of Information.

## 2025-04-07 NOTE — TELEPHONE ENCOUNTER
Rcvd fax from [employer] [place] ppwk needing [place] be [place] out. Inner office and faxed to the forms department.

## 2025-04-08 NOTE — TELEPHONE ENCOUNTER
Additional Family Medical Leave Act forms received. Authorization sent via [employer] message . Logged for processing.       Duplicate Family Medical Leave Act forms received and placed in filling cabinet folder.

## 2025-04-10 NOTE — TELEPHONE ENCOUNTER
Dr Ritter,    Patient is requesting intermittent Family Medical Leave Act due to vertigo:    1-3 flare ups/mo; each episode lasting 1-2 days  Start: 5/01/25 - 4/30/26    Do you support?    Thank you for your time,      Ledy

## 2025-04-10 NOTE — TELEPHONE ENCOUNTER
Called patient for details:    Type of Leave: intermittent  Reason for Leave: vertigo  Start date of leave: 5/01/25  End date of leave:04/30/26  How many flare ups per month/length?: 1-3 flare ups/mo; each episode lasting 1-2 days  How many appts per month/length?: na  Was Fee and Turnaround info Given?:

## 2025-04-11 NOTE — TELEPHONE ENCOUNTER
Duplicate Family Medical Leave Act with Release of Information received in forms dept. Release of Information scanned into patient's chart. Family Medical Leave Act placed in archive.

## 2025-06-16 ENCOUNTER — APPOINTMENT (OUTPATIENT)
Dept: URBAN - METROPOLITAN AREA CLINIC 244 | Age: 60
Setting detail: DERMATOLOGY
End: 2025-06-16

## 2025-06-16 DIAGNOSIS — D18.0 HEMANGIOMA: ICD-10-CM

## 2025-06-16 DIAGNOSIS — L81.4 OTHER MELANIN HYPERPIGMENTATION: ICD-10-CM

## 2025-06-16 DIAGNOSIS — I78.8 OTHER DISEASES OF CAPILLARIES: ICD-10-CM

## 2025-06-16 DIAGNOSIS — L82.1 OTHER SEBORRHEIC KERATOSIS: ICD-10-CM

## 2025-06-16 DIAGNOSIS — Z12.83 ENCOUNTER FOR SCREENING FOR MALIGNANT NEOPLASM OF SKIN: ICD-10-CM

## 2025-06-16 DIAGNOSIS — D22 MELANOCYTIC NEVI: ICD-10-CM

## 2025-06-16 PROBLEM — D22.9 MELANOCYTIC NEVI, UNSPECIFIED: Status: ACTIVE | Noted: 2025-06-16

## 2025-06-16 PROBLEM — D18.01 HEMANGIOMA OF SKIN AND SUBCUTANEOUS TISSUE: Status: ACTIVE | Noted: 2025-06-16

## 2025-06-16 PROBLEM — D48.5 NEOPLASM OF UNCERTAIN BEHAVIOR OF SKIN: Status: ACTIVE | Noted: 2025-06-16

## 2025-06-16 PROCEDURE — OTHER ADDITIONAL NOTES: OTHER

## 2025-06-16 PROCEDURE — 99213 OFFICE O/P EST LOW 20 MIN: CPT

## 2025-06-16 PROCEDURE — OTHER COUNSELING: OTHER

## 2025-06-16 PROCEDURE — OTHER MIPS QUALITY: OTHER

## 2025-06-16 PROCEDURE — OTHER PHOTO-DOCUMENTATION: OTHER

## 2025-06-16 ASSESSMENT — LOCATION DETAILED DESCRIPTION DERM
LOCATION DETAILED: EPIGASTRIC SKIN
LOCATION DETAILED: LEFT NASAL SIDEWALL

## 2025-06-16 ASSESSMENT — LOCATION ZONE DERM
LOCATION ZONE: TRUNK
LOCATION ZONE: NOSE

## 2025-06-16 ASSESSMENT — LOCATION SIMPLE DESCRIPTION DERM
LOCATION SIMPLE: LEFT NOSE
LOCATION SIMPLE: ABDOMEN

## 2025-07-02 ENCOUNTER — TELEPHONE (OUTPATIENT)
Dept: CARDIOLOGY | Age: 60
End: 2025-07-02

## 2025-07-15 ENCOUNTER — LAB ENCOUNTER (OUTPATIENT)
Dept: LAB | Facility: HOSPITAL | Age: 60
End: 2025-07-15
Attending: INTERNAL MEDICINE
Payer: COMMERCIAL

## 2025-07-15 DIAGNOSIS — E03.9 HYPOTHYROIDISM: ICD-10-CM

## 2025-07-15 DIAGNOSIS — I51.9 MYXEDEMA HEART DISEASE: Primary | ICD-10-CM

## 2025-07-15 DIAGNOSIS — E03.9 MYXEDEMA HEART DISEASE: Primary | ICD-10-CM

## 2025-07-15 LAB
T4 FREE SERPL-MCNC: 1.1 NG/DL (ref 0.8–1.7)
TSI SER-ACNC: 2.38 UIU/ML (ref 0.55–4.78)

## 2025-07-15 PROCEDURE — 84439 ASSAY OF FREE THYROXINE: CPT

## 2025-07-15 PROCEDURE — 36415 COLL VENOUS BLD VENIPUNCTURE: CPT

## 2025-07-15 PROCEDURE — 84443 ASSAY THYROID STIM HORMONE: CPT

## 2025-08-25 ENCOUNTER — HOSPITAL ENCOUNTER (OUTPATIENT)
Dept: MAMMOGRAPHY | Age: 60
Discharge: HOME | End: 2025-08-25
Attending: INTERNAL MEDICINE

## 2025-08-25 DIAGNOSIS — Z12.31 ENCOUNTER FOR MAMMOGRAM TO ESTABLISH BASELINE MAMMOGRAM: ICD-10-CM

## 2025-08-25 DIAGNOSIS — Z12.31 ENCOUNTER FOR SCREENING MAMMOGRAM FOR MALIGNANT NEOPLASM OF BREAST: ICD-10-CM

## 2025-08-25 PROCEDURE — 77063 BREAST TOMOSYNTHESIS BI: CPT | Performed by: INTERNAL MEDICINE

## 2025-08-25 PROCEDURE — 77067 SCR MAMMO BI INCL CAD: CPT | Performed by: INTERNAL MEDICINE

## (undated) DIAGNOSIS — I82.402 ACUTE DEEP VEIN THROMBOSIS (DVT) OF LEFT LOWER EXTREMITY, UNSPECIFIED VEIN (HCC): ICD-10-CM

## (undated) NOTE — ED AVS SNAPSHOT
[facility] Emergency Department    [place] [address]     [address] [place] [address]    Phone:  [phone]    Fax:  [phone]   MRN: [id number]    Department:  [facility] Emergency Department   Date of Visit:  2/1 and Class Registration line at [phone] or find a doctor online by visiting [email or web address].    IF THERE IS ANY CHANGE OR WORSENING OF YOUR CONDITION, CALL YOUR PRIMARY CARE PHYSICIAN AT ONCE OR RETURN IMMEDIATELY TO [address] [place] [address].     If

## (undated) NOTE — LETTER
February 19, 2017    Patient: Ivy Miller   Date of Visit: 2/19/2017       To Whom It May Concern: Trish Carty was seen and treated in our emergency department on 2/19/2017. She is excused from work 1-2 days.     If you have any questions or

## (undated) NOTE — ED AVS SNAPSHOT
Bia Rodriguez   MRN: [id number]    Department:  [facility]   Date of Visit:  3/21/2018           Disclosure     Insurance plans vary and the physician(s) referred by the ER may not be covered by your plan.  Please contact CARE PHYSICIAN AT ONCE OR RETURN IMMEDIATELY TO THE EMERGENCY DEPARTMENT. If you have been prescribed any medication(s), please fill your prescription right away and begin taking the medication(s) as directed.   If you believe that any of the medications

## (undated) NOTE — Clinical Note
Met with Lynette Mercado today for weight loss management. She is walking and working out with stubborn belly fat. Will start Diethylpropion to help increase her metabolism and decrease appetite. Starting her off at the smallest dose, given her history of cardiomyopathies.      Thanks    Cosmo Stroud MD  Medical Director for [address] [place] [address] Weight loss Clinics

## (undated) NOTE — LETTER
Patient Name: Danni Cope  YOB: 1965          MRN number:  [id number]  Date:  5/7/2024  Referring Physician:  Caro Jeffries         VESTIBULAR EVALUATION:     Diagnosis:   BPPV (benign paroxysmal positional vertigo)       Referring Provider: Caro Jeffries  Date of Evaluation:    5/7/2024    Precautions:  None Next MD visit:   none scheduled  Date of Surgery: n/a     PATIENT SUMMARY   Danni Cope is a 58 year old female who presents to therapy today with reports of dizziness and vertigo. She states that she woke on 4/29/24 with severe dizziness and nausea. Initially thought she might be dehydrated, had to crawl to the bathroom for water, however, dizziness continued. States she stayed in bed for over an hour with eyes closed, but symptoms did not stop, so went to the ED. Had imaging of the brain done that was unremarkable. Was admitted due to severity of symptoms. Was given meclizine and discharged the following morning. States the next few days felt gradually better and symptoms have since resolved. Present today to be assessed and ensure no further issues. Also has MRA of the brain scheduled for this evening. She has BPPV in 2020, symptoms were varied from that occasion.     Patient denies: Recent illness, recent boat/train travel, hitting her head, MVA, previous concussion, other known neurological issue, issue with BP, CV diagnoses other than known cardiomyopathy, change in medication.     Falls: None, denies  Medication: Given meclizine, has not continued to take due to side effects and resolution of dizziness  Imaging: CT and MRI of brain unremarkable  Vision: Denies double vision/blurry vision  Visual Motion: Denies, but has always had motion sickness  N/V: Resolved  N/T: Denies    Symptoms with cough/sneeze or loud noise: No   Falls: No   Hx of migraines: No   Hx of vision issue: No   Hx of hearing issues: none     Dizziness: Current 0/10, Best 0/10, Worst 10/10  Quality:  kind of spinning, hard to explain  Frequency/Duration: Constant for 24 hours  Aggravates: Unsure, when she was dizzy, could not do anything, everything was bothersome  Relieves: Not moving    Headache:   Location: [place], wondering if this is related to allergies, this is typically the time of year they are worst for her  Frequency/Duration:  can vary in length, usually not longer than an hour  Aggravates: Unsure   Relieves: Tylenol when needed    Cervical pain: None, denies    Dizziness Handicap Inventory (DHI): 8/100     Current functional limitations include currently none, states that she is no longer restricting any activity and really would like to get back to work.   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   Pt goals include to return to work and PLOF.   Past medical history was reviewed with Danni. Significant findings include:  Past Medical History:    Cardiomyopathy (HCC)    Hyperlipidemia    Left leg DVT (HCC)    Obesity (BMI 30-39.9)    Visual impairment         ASSESSMENT  Danni is a 58 year old female who presents for skilled physical therapy evaluation on 5/7/24 with primary c/o dizziness and vertigo that began without known cause on 4/29/24. The results of the objective tests and measures show vestibulo-ocular and oculomotor assessments were unremarkable. Positional assessment positive for subjective BPPV with L Maggie-Hallpike, however, no nystagmus noted x 2.  Functional deficits include but are not limited to none currently.  Signs and symptoms are consistent with diagnosis of possible L posterior canal canalithiasis BPPV. Pt and PT discussed evaluation findings, pathology, POC and HEP.  Pt voiced understanding and performs HEP correctly. Skilled Physical Therapy is medically necessary to address the  above impairments and reach functional goals.    OBJECTIVE:   Physical Exam:  Posture/Observation: Patient sits and stands with appropriate posturing; she is pleasant, oriented, compliant, and appropriate, expresses motivation to improve.    Neuro Screen: Sensation: light touch intact     Coordination Testing:   Finger to Nose: WNL   Pronation/supination: WNL   Toe tapping: WNL     Cervical spine ROM: WNL throughout, asymptomatic  Adverse neuro signs with ROM: yes no: no     Occulomotor & Vestibulo-Ocular Exam:  Spontaneous Nystagmus: room light: Negative ;  fixation blocked: NT  Smooth Pursuit: Negative  Saccades: Negative  Gaze Evoked Nystagmus:  room light: Negative; fixation blocked: Not Tested  Head Thrust: Negative  VOR screen:  Negative     VOR Cancellation: Negative   Convergence: Negative  Cover/Uncover:  Negative  Cross Cover:  Negative  Head Shaking Nystagmus: Negative  Dynamic Visual Acuity:  Not Tested    Positional Testing:   Maggie-Hallpike: R Negative, L Positive for brief subjective dizziness x ~5 sec x 2   Roll Test (HC): Negative      Postural Control:   Romberg: EO >30 sec, EC >30 sec   Romberg on Foam: EO >30 sec, EC >30 sec   Tandem Stance: NT  SLS: NT    Functional Mobility:   Gait: pt ambulates on level ground without AD or deviation  Dynamic Gait Index (DGI): 24/24   Timed Up and Go: NT, TBA in future as appropriate   Stairs:NT, TBA in future as appropriate     Today's Treatment and Response: Performed CRM L Epley x 2 due to subjective BPPV, no nystagmus visualized this date. Patient feeling well and able to ambulate IND from session. Educated that will give her 2 weeks to continue to assess symptoms, should dizziness return in that time, should return to PT. If continues to feel well, will cancel remaining appts.   Pt education was provided on exam findings, treatment diagnosis, treatment plan, expectations, and prognosis. Pt was also provided recommendations for possible dizziness after  evaluation and possible fatigue after evaluation .   Patient was instructed in and issued a HEP for: continued symptom monitoring    Charges: PT Eval Low Complexity, CRM x 1      Total Timed Treatment: 15 min     Total Treatment Time: 50 min     Based on clinical rationale and outcome measures, this evaluation involved Low Complexity decision making.  PLAN OF CARE:    Goals: (To be met in 4 visits)  Patient will report 0/10 dizziness with all normal activity x at least 2 weeks.   Patient will demonstrate ability to perform rolling R/L without dizziness.   Patient will report return to all work activities without dizziness or limitation.   Patient will be IND and compliant in HEP to maintain progress made in PT.       Frequency / Duration: Patient will be seen for 1-2 x/week or a total of 8 visits over a 90 day period. Treatment will include: home exercise program development and instruction, patient/family education, balance training, canalith repositioning maneuver, eye/head coordination exercises, sensory organization training, optokinetic stimulation , ROM, exertion training, gait training, manual therapy, and strengthening. Based on resolving symptoms, patient to be placed on hold x 6-8 weeks (will f/u via telephone at latest 2 weeks from eval), should patient note regression or return of symptoms in that time, may return for further services as needed. If continues to do well, will consider this date D/C from PT.     Education or treatment limitation: None   Rehab Potential: good     Patient/Family/Caregiver was advised of these findings, precautions, and treatment options and has agreed to actively participate in planning and for this course of care.     Thank you for your referral. Please co-sign or sign and return this letter via fax as soon as possible to [phone]. If you have any questions, please contact me at Dept: [phone]    Sincerely,  Electronically signed by therapist: Margarita Johnson, PT,  DPT  Physician's certification required: Yes  I certify the need for these services furnished under this plan of treatment and while under my care.    X___________________________________________________ Date____________________    Certification From: 5/7/2024  To:8/5/2024 21st Century Cures Act Notice to Patient: Medical documents like this are made available to patients in the interest of transparency. However, be advised this is a medical document and it is intended as efee-ur-rjod communication between your medical providers. This medical document may contain abbreviations, assessments, medical data, and results or other terms that are unfamiliar. Medical documents are intended to carry relevant information, facts as evident, and the clinical opinion of the practitioner. As such, this medical document may be written in language that appears blunt or direct. You are encouraged to contact your medical provider and/or [facility] if you have any questions about this medical document.

## (undated) NOTE — ED AVS SNAPSHOT
[facility] Emergency Department    [address]     [address] [place] [address]    Phone:  [phone]    Fax:  [address]   MRN: [id number]    Department:  [facility] Emergency Department   Date of Visit:  2/1 You can get these medications from any pharmacy     Bring a paper prescription for each of these medications    - azithromycin 250 MG Tabs  - predniSONE 20 MG Tabs  - saccharomyces boulardii 250 MG Caps            Discharge References/Attachments     AB and Class Registration line at [phone] or find a doctor online by visiting [email or web address].    IF THERE IS ANY CHANGE OR WORSENING OF YOUR CONDITION, CALL YOUR PRIMARY CARE PHYSICIAN AT ONCE OR RETURN IMMEDIATELY TO [address] [place] [address].     If you to explore options for quitting.     - If you have concerns related to behavioral health issues or thoughts of harming yourself, contact [facility] at [phone].     - If you don’t have insurance, Sharmila Perez

## (undated) NOTE — LETTER
Date & Time: 3/8/2020, 1:54 PM  Patient: Stevo Meraz  Encounter Provider(s):    Genaro Clayton MD       To Whom It May Concern: Guzman Jose was seen and treated in our department on 3/8/2020. She is excused from work for 2-3 days.     If you h

## (undated) NOTE — IP AVS SNAPSHOT
[address]  [address] [facility] [phone]                Discharge Summary   4/13/2017    Johnnie Osgood           Admission Information        Provider Department    4/13/2017 Xiomy Borden DO [facility] 5w as possible for a visit in 1 week.     Specialties:  Internal Medicine, PEDIATRICS    Why:  [address] [place] [address] information:    [address] [id number] [phone]          Follow up with Stanley Urbano MD. Jessika Reeder HgbA1C Glucose BUN Creatinine Calcium Alkaline Phosph AST    -- (04/15/17)  95 (04/15/17)  6 (L) (04/15/17)  0.84 (04/15/17)  9.1 (02/19/17)  78 (02/19/17)  25      Metabolic Lab Results  (Last result in the past 90 days)    ALT Bilirubin,Total Total Prot Kam questions? Call [phone] for help. [facility] is NOT to be used for urgent needs.   For medical emergencies, dial 911.             _____________________________________________________________________________    Medication Side Effects - [employer]

## (undated) NOTE — ED AVS SNAPSHOT
Jarek Pabon   MRN: [id number]    Department:  [facility] Emergency Department   Date of Visit:  3/8/2020           Disclosure     Insurance plans vary and the physician(s) referred by the ER may not be covered by your plan.  Please contact CARE PHYSICIAN AT ONCE OR RETURN IMMEDIATELY TO THE EMERGENCY DEPARTMENT. If you have been prescribed any medication(s), please fill your prescription right away and begin taking the medication(s) as directed.   If you believe that any of the medications

## (undated) NOTE — ED AVS SNAPSHOT
Mikayla Esparza   MRN: [id number]    Department:  [facility] Emergency Department   Date of Visit:  1/28/2018           Disclosure     Insurance plans vary and the physician(s) referred by the ER may not be covered by your plan.  Please contact CARE PHYSICIAN AT ONCE OR RETURN IMMEDIATELY TO THE EMERGENCY DEPARTMENT. If you have been prescribed any medication(s), please fill your prescription right away and begin taking the medication(s) as directed.   If you believe that any of the medications